# Patient Record
Sex: MALE | Race: WHITE | NOT HISPANIC OR LATINO | Employment: FULL TIME | URBAN - NONMETROPOLITAN AREA
[De-identification: names, ages, dates, MRNs, and addresses within clinical notes are randomized per-mention and may not be internally consistent; named-entity substitution may affect disease eponyms.]

---

## 2020-01-30 ENCOUNTER — HOSPITAL ENCOUNTER (OUTPATIENT)
Dept: ULTRASOUND IMAGING | Facility: HOSPITAL | Age: 40
Discharge: HOME | End: 2020-01-30
Admitting: NURSE PRACTITIONER

## 2020-01-30 ENCOUNTER — TRANSCRIBE ORDERS (OUTPATIENT)
Dept: ULTRASOUND IMAGING | Facility: HOSPITAL | Age: 40
End: 2020-01-30

## 2020-01-30 DIAGNOSIS — R10.9 ABDOMINAL PAIN, UNSPECIFIED ABDOMINAL LOCATION: ICD-10-CM

## 2020-01-30 DIAGNOSIS — R10.9 ABDOMINAL PAIN, UNSPECIFIED ABDOMINAL LOCATION: Primary | ICD-10-CM

## 2020-01-30 PROCEDURE — 76705 ECHO EXAM OF ABDOMEN: CPT

## 2022-08-17 ENCOUNTER — HOSPITAL ENCOUNTER (OUTPATIENT)
Dept: GENERAL RADIOLOGY | Facility: HOSPITAL | Age: 42
Discharge: HOME | End: 2022-08-17

## 2022-08-17 ENCOUNTER — TRANSCRIBE ORDERS (OUTPATIENT)
Dept: GENERAL RADIOLOGY | Facility: HOSPITAL | Age: 42
End: 2022-08-17

## 2022-08-17 DIAGNOSIS — Z02.1 ENCOUNTER FOR PRE-EMPLOYMENT EXAMINATION: ICD-10-CM

## 2022-08-17 DIAGNOSIS — Z02.1 ENCOUNTER FOR PRE-EMPLOYMENT EXAMINATION: Primary | ICD-10-CM

## 2022-08-17 PROCEDURE — 71046 X-RAY EXAM CHEST 2 VIEWS: CPT

## 2022-08-17 PROCEDURE — 71046 X-RAY EXAM CHEST 2 VIEWS: CPT | Performed by: RADIOLOGY

## 2024-04-17 ENCOUNTER — OFFICE VISIT (OUTPATIENT)
Dept: FAMILY MEDICINE CLINIC | Facility: CLINIC | Age: 44
End: 2024-04-17
Payer: COMMERCIAL

## 2024-04-17 VITALS
HEART RATE: 79 BPM | WEIGHT: 248 LBS | DIASTOLIC BLOOD PRESSURE: 80 MMHG | BODY MASS INDEX: 34.72 KG/M2 | OXYGEN SATURATION: 96 % | SYSTOLIC BLOOD PRESSURE: 100 MMHG | HEIGHT: 71 IN

## 2024-04-17 DIAGNOSIS — R73.03 PREDIABETES: Primary | ICD-10-CM

## 2024-04-17 DIAGNOSIS — N64.4 BREAST PAIN, RIGHT: ICD-10-CM

## 2024-04-17 DIAGNOSIS — F41.9 ANXIETY: ICD-10-CM

## 2024-04-17 DIAGNOSIS — K21.9 GASTROESOPHAGEAL REFLUX DISEASE WITHOUT ESOPHAGITIS: ICD-10-CM

## 2024-04-17 DIAGNOSIS — M10.9 GOUT, UNSPECIFIED CAUSE, UNSPECIFIED CHRONICITY, UNSPECIFIED SITE: ICD-10-CM

## 2024-04-17 DIAGNOSIS — N62 GYNECOMASTIA, MALE: ICD-10-CM

## 2024-04-17 DIAGNOSIS — I10 PRIMARY HYPERTENSION: ICD-10-CM

## 2024-04-17 DIAGNOSIS — E78.1 PURE HYPERTRIGLYCERIDEMIA: ICD-10-CM

## 2024-04-17 DIAGNOSIS — T78.40XS ALLERGY, SEQUELA: ICD-10-CM

## 2024-04-17 LAB
EXPIRATION DATE: NORMAL
HBA1C MFR BLD: 5.6 % (ref 4.5–5.7)
Lab: NORMAL

## 2024-04-17 RX ORDER — CETIRIZINE HYDROCHLORIDE 10 MG/1
TABLET ORAL
COMMUNITY
Start: 2024-04-15

## 2024-04-17 RX ORDER — FENOFIBRATE 160 MG/1
TABLET ORAL
COMMUNITY
Start: 2024-03-20

## 2024-04-17 RX ORDER — ALLOPURINOL 300 MG/1
TABLET ORAL
COMMUNITY
Start: 2024-03-19

## 2024-04-17 RX ORDER — ESCITALOPRAM OXALATE 5 MG/1
TABLET ORAL
COMMUNITY
Start: 2023-12-21 | End: 2024-04-17 | Stop reason: SDUPTHER

## 2024-04-17 RX ORDER — ESCITALOPRAM OXALATE 5 MG/1
5 TABLET ORAL DAILY
Qty: 30 TABLET | Refills: 0 | Status: SHIPPED | OUTPATIENT
Start: 2024-04-17

## 2024-04-17 RX ORDER — OMEPRAZOLE 40 MG/1
CAPSULE, DELAYED RELEASE ORAL
COMMUNITY

## 2024-04-17 RX ORDER — ESCITALOPRAM OXALATE 5 MG/1
5 TABLET ORAL DAILY
Qty: 90 TABLET | Refills: 3 | Status: SHIPPED | OUTPATIENT
Start: 2024-04-17

## 2024-04-17 NOTE — PROGRESS NOTES
New Patient History and Physical      Referring Physician: No ref. provider found    Subjective     Chief Complaint:    Chief Complaint   Patient presents with    Heartburn    Hyperlipidemia    Anxiety    Depression       History of Present Illness:   Here as a new patient, prior patient of Dr London at [facility]  Anxiety, lexparo, controlled  Gerd, prilosec, controlled  Gout, allopurinal, last flare October  Hypertrigs, fenofibrate, has been on for about 3 years  Diabetes/prediabetes, not on meds, attempts to work on diet, lost 10lbs and weight loss has stalled   Right breast lump in novemeber, started bilaterally but now just on right, had mammogram at [facility] in [place] that was normal,       Review of Systems   Gen- No fevers, chills  CV- No chest pain, palpitations  Resp- No cough, dyspnea  GI- No N/V/D, abd pain  Neuro-No dizziness, headaches    Past Medical History:   Past Medical History:   Diagnosis Date    Allergies     Anxiety     Diabetes mellitus     GERD (gastroesophageal reflux disease)     Gout     HLD (hyperlipidemia)     Hypertension     Kidney stone        Past Surgical History:  Past Surgical History:   Procedure Laterality Date    APPENDECTOMY         Family History: family history includes Arthritis in his brother, father, and sister; Cancer in his father and paternal grandfather; Diabetes in his mother; Hyperlipidemia in his brother; Hypertension in his brother; Other in his brother, mother, and sister.    Social History:  reports that he quit smoking about 18 years ago. His smoking use included cigarettes. He has been exposed to tobacco smoke. He has never used smokeless tobacco. He reports that he does not drink alcohol and does not use drugs.    Medications:    Current Outpatient Medications:     allopurinol (ZYLOPRIM) 300 MG tablet, , Disp: , Rfl:     cetirizine (zyrTEC) 10 MG tablet, , Disp: , Rfl:     escitalopram (LEXAPRO) 5 MG tablet, Take 1 tablet by mouth  "Daily., Disp: 90 tablet, Rfl: 3    fenofibrate 160 MG tablet, , Disp: , Rfl:     multivitamin with minerals (MULTIVITAMIN ADULT PO), Take 1 tablet by mouth Daily., Disp: , Rfl:     omeprazole (priLOSEC) 40 MG capsule, omeprazole 40 mg capsule,delayed release, Disp: , Rfl:     escitalopram (Lexapro) 5 MG tablet, Take 1 tablet by mouth Daily., Disp: 30 tablet, Rfl: 0    Allergies:  Allergies   Allergen Reactions    Prednisone Dizziness       Objective     Vital Signs:   Vitals:    04/17/24 1333   BP: 100/80   Pulse: 79   SpO2: 96%   Weight: 112 kg (248 lb)   Height: 180.3 cm (71\")     Body mass index is 34.59 kg/m².    Physical Exam:    Physical Exam  Constitutional:       Appearance: Normal appearance. He is well-developed.   HENT:      Head: Normocephalic and atraumatic.      Right Ear: Tympanic membrane, ear canal and external ear normal.      Left Ear: Tympanic membrane, ear canal and external ear normal.      Nose: Nose normal.      Mouth/Throat:      Pharynx: Uvula midline.   Eyes:      Pupils: Pupils are equal, round, and reactive to light.   Cardiovascular:      Rate and Rhythm: Normal rate and regular rhythm.      Heart sounds: Normal heart sounds. No murmur heard.     No friction rub. No gallop.   Pulmonary:      Effort: Pulmonary effort is normal.      Breath sounds: Normal breath sounds.   Abdominal:      General: Bowel sounds are normal.      Palpations: Abdomen is soft.      Tenderness: There is no abdominal tenderness.   Musculoskeletal:      Cervical back: Neck supple.   Lymphadenopathy:      Head:      Right side of head: No submental, submandibular, tonsillar, preauricular or posterior auricular adenopathy.      Left side of head: No submental, submandibular, tonsillar, preauricular or posterior auricular adenopathy.      Cervical: No cervical adenopathy.   Skin:     General: Skin is warm and dry.   Neurological:      General: No focal deficit present.      Mental Status: He is alert and oriented to " person, place, and time.   Psychiatric:         Mood and Affect: Mood normal.         Behavior: Behavior normal.         Assessment / Plan     Assessment/Plan:   Problem List Items Addressed This Visit       Allergies    Anxiety    Relevant Medications    escitalopram (LEXAPRO) 5 MG tablet    GERD (gastroesophageal reflux disease)    Relevant Medications    omeprazole (priLOSEC) 40 MG capsule    Gout    HLD (hyperlipidemia)    Relevant Medications    fenofibrate 160 MG tablet    Hypertension     Other Visit Diagnoses       Prediabetes    -  Primary    Relevant Orders    POC Glycosylated Hemoglobin (Hb A1C) (Completed)    Gynecomastia, male        Breast pain, right              -- A1c 5.6, continue diet and exercise  -- continue current meds  -- check labs      Discussed plan of care in detail with pt today; pt verb understanding and agrees.    I have reviewed pertinent health maintenance applicable to this patient.    Follow up:  Return in about 4 months (around 8/17/2024).      Electronically signed by EDDI Doyle   04/17/2024 13:45 EDT      Please note that portions of this note were completed with a voice recognition program.

## 2024-06-07 ENCOUNTER — OFFICE VISIT (OUTPATIENT)
Dept: FAMILY MEDICINE CLINIC | Facility: CLINIC | Age: 44
End: 2024-06-07
Payer: COMMERCIAL

## 2024-06-07 VITALS
DIASTOLIC BLOOD PRESSURE: 60 MMHG | BODY MASS INDEX: 35.84 KG/M2 | OXYGEN SATURATION: 95 % | HEART RATE: 82 BPM | SYSTOLIC BLOOD PRESSURE: 106 MMHG | HEIGHT: 71 IN | WEIGHT: 256 LBS

## 2024-06-07 DIAGNOSIS — M25.571 ACUTE RIGHT ANKLE PAIN: ICD-10-CM

## 2024-06-07 DIAGNOSIS — M77.51 RIGHT ANKLE TENDONITIS: ICD-10-CM

## 2024-06-07 DIAGNOSIS — F41.9 ANXIETY: Primary | ICD-10-CM

## 2024-06-07 DIAGNOSIS — N62 GYNECOMASTIA, MALE: ICD-10-CM

## 2024-06-07 DIAGNOSIS — N64.4 BREAST PAIN, RIGHT: ICD-10-CM

## 2024-06-07 PROCEDURE — 99214 OFFICE O/P EST MOD 30 MIN: CPT | Performed by: NURSE PRACTITIONER

## 2024-06-07 RX ORDER — BUPROPION HYDROCHLORIDE 150 MG/1
150 TABLET ORAL DAILY
Qty: 30 TABLET | Refills: 1 | Status: SHIPPED | OUTPATIENT
Start: 2024-06-07

## 2024-06-07 RX ORDER — COLCHICINE 0.6 MG/1
TABLET ORAL
COMMUNITY

## 2024-06-07 NOTE — PROGRESS NOTES
Subjective     Chief Complaint:    Chief Complaint   Patient presents with    Anxiety     Discuss anxiety medication. Patient states that he is staying tired and hungry all the time, and sexual side effects from his medication.      Ankle Pain     Complaints of right ankle pain.    Cyst     Complaints of lump on chest that he would like to discuss. Patient states that this has been an on going issue.       History of Present Illness:   Stays tired all the time and is more hungry than he used to be, has basically destroyed sexual desire, has gained about 10lbs since last visit and has not changed his eating habits   Usually sleeps 7-8 hours, does not drink any energy drinks, no additional caffeine intake   Has been on Lexapro for about 4-5 months     Right ankle pain on the posterior side of ankle for 2-3 months, endorses shooting pain into heel at times and back of calf, is intermittent   Denies any recent falls, injury to ankle, or change in activity - has noticed some slight swelling in right ankle at times   Does take ibuprofen for pain in ankle, helps slightly with pain  Patient's spouse is worried that he has restless legs, legs twitch about every 5-10 seconds per spouse, has woken up a few times from the twitching, legs feel tired and uncomfortable at times     Had prior mammogram and ultrasound of cyst (-), is  to the touch and feels like it has gotten slightly larger   Is interested in having a referral to have it removed       Review of Systems  Gen- No fevers, chills  CV- No chest pain, palpitations  Resp- No cough, dyspnea  GI- No N/V/D, abd pain  Neuro-No dizziness, headaches      I have reviewed and/or updated the patient's past medical, surgical, family, social history and problem list as appropriate.     Medications:    Current Outpatient Medications:     allopurinol (ZYLOPRIM) 300 MG tablet, , Disp: , Rfl:     cetirizine (zyrTEC) 10 MG tablet, , Disp: , Rfl:     colchicine 0.6 MG  "tablet, Take 2 tablets at first sign of flare, then take 1 tablet 1 hour later.  On subsequent days, take 1 tablet daily until flare is resolved., Disp: , Rfl:     fenofibrate 160 MG tablet, , Disp: , Rfl:     multivitamin with minerals (MULTIVITAMIN ADULT PO), Take 1 tablet by mouth Daily., Disp: , Rfl:     omeprazole (priLOSEC) 40 MG capsule, omeprazole 40 mg capsule,delayed release, Disp: , Rfl:     buPROPion XL (Wellbutrin XL) 150 MG 24 hr tablet, Take 1 tablet by mouth Daily., Disp: 30 tablet, Rfl: 1    Allergies:  Allergies   Allergen Reactions    Prednisone Dizziness       Objective     Vital Signs:   Vitals:    06/07/24 0918   BP: 106/60   Pulse: 82   SpO2: 95%   Weight: 116 kg (256 lb)   Height: 180.3 cm (70.98\")     Body mass index is 35.72 kg/m².    Physical Exam:    Physical Exam  Constitutional:       Appearance: He is obese.   HENT:      Head: Normocephalic and atraumatic.   Cardiovascular:      Rate and Rhythm: Normal rate and regular rhythm.      Pulses: Normal pulses.      Heart sounds: Normal heart sounds.   Pulmonary:      Effort: Pulmonary effort is normal.      Breath sounds: Normal breath sounds.   Abdominal:      General: Bowel sounds are normal.   Musculoskeletal:      Cervical back: Normal range of motion and neck supple.      Right foot: Tenderness present.      Comments: Pain with abduction and flexion    Skin:     General: Skin is warm and dry.   Neurological:      General: No focal deficit present.      Mental Status: He is alert. Mental status is at baseline.   Psychiatric:         Mood and Affect: Mood normal.         Behavior: Behavior normal.       Assessment / Plan     Assessment/Plan:   Problem List Items Addressed This Visit       Anxiety - Primary    Relevant Medications    buPROPion XL (Wellbutrin XL) 150 MG 24 hr tablet     Other Visit Diagnoses       Gynecomastia, male        Relevant Orders    Ambulatory Referral to General Surgery    Breast pain, right        Relevant Orders "    Ambulatory Referral to General Surgery    Acute right ankle pain        Relevant Orders    Ambulatory Referral to Physical Therapy    XR Ankle 3+ View Right (In Office) (Completed)    Right ankle tendonitis        Relevant Orders    Ambulatory Referral to Physical Therapy    XR Ankle 3+ View Right (In Office) (Completed)          -- Stop Escitalopram, start Bupropion XL - discussed tapering of lexapro for the next month to avoid withdrawal symptoms   -- Ankle xray ordered, referral to PT   -- Referral to general surgery for breast cyst evaluation     Discussed plan of care in detail with pt today; pt verb understanding and agrees.    Follow up:  8 weeks     IAriane, personally performed the services described in this documentation, as scribed by EDDI Uriostegui student in my presence, and is both accurate and complete       Please note that portions of this note were completed with a voice recognition program.

## 2024-06-14 DIAGNOSIS — R93.89 ABNORMAL X-RAY: ICD-10-CM

## 2024-06-14 DIAGNOSIS — M25.571 ACUTE RIGHT ANKLE PAIN: Primary | ICD-10-CM

## 2024-06-14 DIAGNOSIS — M77.51 RIGHT ANKLE TENDONITIS: ICD-10-CM

## 2024-06-25 NOTE — PROGRESS NOTES
Patient: Zi Nuñez    YOB: 1980    Date: 06/26/2024    Primary Care Provider: Ariane Kemp APRN    Chief Complaint   Patient presents with    Breast Mass       SUBJECTIVE:    History of present illness:  Patient is here for evaluation of pain with a palpable mass in his right breast.    Apparently the patient has had right breast pain and a palpable right breast mass over the past 6 months.  He has undergone previous mammogram in [place] and was felt to have no evidence of clinical abnormalities on mammography.  He continues to complain of right breast pain, sharp in nature, more located in the upper outer quadrant, worse with pressure, does not seem to be relieved, seems to be getting worse in nature.    The following portions of the patient's history were reviewed and updated as appropriate: allergies, current medications, past family history, past medical history, past social history, past surgical history and problem list.      Review of Systems   Constitutional:  Negative for chills, fever and unexpected weight change.   HENT:  Negative for trouble swallowing and voice change.    Eyes:  Negative for visual disturbance.   Respiratory:  Negative for apnea, cough, chest tightness, shortness of breath and wheezing.    Cardiovascular:  Negative for chest pain, palpitations and leg swelling.   Gastrointestinal:  Negative for abdominal distention, abdominal pain, anal bleeding, blood in stool, constipation, diarrhea, nausea, rectal pain and vomiting.   Endocrine: Negative for cold intolerance and heat intolerance.   Genitourinary:  Negative for difficulty urinating, dysuria, flank pain, scrotal swelling and testicular pain.   Musculoskeletal:  Negative for back pain, gait problem and joint swelling.   Skin:  Negative for color change, rash and wound.   Neurological:  Negative for dizziness, syncope, speech difficulty, weakness, numbness and headaches.   Hematological:  Negative for  adenopathy. Does not bruise/bleed easily.   Psychiatric/Behavioral:  Negative for confusion. The patient is not nervous/anxious.        Allergies:  Allergies   Allergen Reactions    Prednisone Dizziness       Medications:    Current Outpatient Medications:     allopurinol (ZYLOPRIM) 300 MG tablet, , Disp: , Rfl:     buPROPion XL (Wellbutrin XL) 150 MG 24 hr tablet, Take 1 tablet by mouth Daily., Disp: 30 tablet, Rfl: 1    cetirizine (zyrTEC) 10 MG tablet, , Disp: , Rfl:     colchicine 0.6 MG tablet, Take 2 tablets at first sign of flare, then take 1 tablet 1 hour later.  On subsequent days, take 1 tablet daily until flare is resolved., Disp: , Rfl:     fenofibrate 160 MG tablet, , Disp: , Rfl:     multivitamin with minerals (MULTIVITAMIN ADULT PO), Take 1 tablet by mouth Daily., Disp: , Rfl:     omeprazole (priLOSEC) 40 MG capsule, omeprazole 40 mg capsule,delayed release, Disp: , Rfl:     History:  Past Medical History:   Diagnosis Date    Allergies     Anxiety     Diabetes mellitus     GERD (gastroesophageal reflux disease)     Gout     HLD (hyperlipidemia)     Hypertension     Kidney stone        Past Surgical History:   Procedure Laterality Date    APPENDECTOMY         Family History   Problem Relation Age of Onset    Other Mother     Diabetes Mother     Cancer Father     Arthritis Father     Other Sister     Arthritis Sister     Other Brother     Hyperlipidemia Brother     Hypertension Brother     Arthritis Brother     Cancer Paternal Grandfather        Social History     Tobacco Use    Smoking status: Former     Current packs/day: 0.00     Types: Cigarettes     Quit date:      Years since quittin.4     Passive exposure: Past    Smokeless tobacco: Never   Vaping Use    Vaping status: Never Used   Substance Use Topics    Alcohol use: Never    Drug use: Never        OBJECTIVE:    Vital Signs:   Vitals:    24 1531   BP: 102/64   Pulse: 71   Temp: 97.1 °F (36.2 °C)   SpO2: 96%   Weight: 116 kg (255  "lb)   Height: 180.3 cm (70.98\")         Physical Exam:     General Appearance:    Alert, cooperative, in no acute distress   Head:    Normocephalic, without obvious abnormality, atraumatic   Eyes:            Lids and lashes normal, conjunctivae and sclerae normal, no   icterus, no pallor, corneas clear, PERRLA   Ears:    Ears appear intact with no abnormalities noted   Throat:   No oral lesions, no thrush, oral mucosa moist   Neck:   No adenopathy, supple, trachea midline, no thyromegaly, no   carotid bruit, no JVD   Back:     No kyphosis present, no scoliosis present, no skin lesions,      erythema or scars, no tenderness to percussion or                   palpation,   range of motion normal   Lungs:     Clear to auscultation,respirations regular, even and                  unlabored    Heart:    Regular rhythm and normal rate, normal S1 and S2, no            murmur, no gallop, no rub, no click   Chest Wall:    No abnormalities observed   Abdomen:     Normal bowel sounds, no masses, no organomegaly, soft        non-tender, non-distended, no guarding, there is evidence of right upper quadrant tenderness   Extremities:   Moves all extremities well, no edema, no cyanosis, no             redness   Pulses:   Pulses palpable and equal bilaterally   Breast:  Skin:   No evidence of palpable mass in the left breast, right breast is tender and has a palpable abnormality under the nipple    No bleeding, bruising or rash   Lymph nodes:   No palpable adenopathy   Neurologic:   Cranial nerves 2 - 12 grossly intact, sensation intact, DTR       present and equal bilaterally     Results Review:   I reviewed the patient's new clinical results.  I reviewed the patient's new imaging results and agree with the interpretation.  I reviewed the patient's other test results and agree with the interpretation    Review of Systems was reviewed and confirmed as accurate as documented by the MA.    ASSESSMENT/PLAN:    1. Breast pain        I did " have a detailed and extensive discussion with the patient in the office today and he does have right breast tenderness and a palpable mass and I am suspicious that he may need to undergo subcutaneous mastectomy.  This very well could be gynecomastia but we need to rule out something else, full risk and benefits of operative versus nonoperative intervention have been discussed with the patient, he understands and agrees, and wishes to proceed.    I discussed the patients findings and my recommendations with patient        Electronically signed by James Thomson MD  06/26/24

## 2024-06-26 ENCOUNTER — OFFICE VISIT (OUTPATIENT)
Dept: SURGERY | Facility: CLINIC | Age: 44
End: 2024-06-26
Payer: COMMERCIAL

## 2024-06-26 VITALS
HEIGHT: 71 IN | DIASTOLIC BLOOD PRESSURE: 64 MMHG | TEMPERATURE: 97.1 F | HEART RATE: 71 BPM | WEIGHT: 255 LBS | OXYGEN SATURATION: 96 % | BODY MASS INDEX: 35.7 KG/M2 | SYSTOLIC BLOOD PRESSURE: 102 MMHG

## 2024-06-26 DIAGNOSIS — N64.4 BREAST PAIN: Primary | ICD-10-CM

## 2024-06-26 PROCEDURE — 99204 OFFICE O/P NEW MOD 45 MIN: CPT | Performed by: SURGERY

## 2024-06-28 ENCOUNTER — TELEPHONE (OUTPATIENT)
Dept: SURGERY | Facility: CLINIC | Age: 44
End: 2024-06-28
Payer: COMMERCIAL

## 2024-06-28 NOTE — TELEPHONE ENCOUNTER
YAYAM denise Pinon to call and reschedule PO appt with .Provider will be out of the office on 06/30/2024.

## 2024-07-16 ENCOUNTER — OUTSIDE FACILITY SERVICE (OUTPATIENT)
Dept: SURGERY | Facility: CLINIC | Age: 44
End: 2024-07-16
Payer: COMMERCIAL

## 2024-07-16 DIAGNOSIS — N64.4 BREAST PAIN: Primary | ICD-10-CM

## 2024-07-16 RX ORDER — HYDROCODONE BITARTRATE AND ACETAMINOPHEN 7.5; 325 MG/1; MG/1
1 TABLET ORAL EVERY 6 HOURS PRN
Qty: 20 TABLET | Refills: 0 | Status: SHIPPED | OUTPATIENT
Start: 2024-07-16

## 2024-07-26 ENCOUNTER — PRIOR AUTHORIZATION (OUTPATIENT)
Dept: FAMILY MEDICINE CLINIC | Facility: CLINIC | Age: 44
End: 2024-07-26
Payer: COMMERCIAL

## 2024-07-26 ENCOUNTER — OFFICE VISIT (OUTPATIENT)
Dept: FAMILY MEDICINE CLINIC | Facility: CLINIC | Age: 44
End: 2024-07-26
Payer: COMMERCIAL

## 2024-07-26 VITALS
HEART RATE: 81 BPM | OXYGEN SATURATION: 95 % | SYSTOLIC BLOOD PRESSURE: 110 MMHG | WEIGHT: 254 LBS | DIASTOLIC BLOOD PRESSURE: 80 MMHG | HEIGHT: 71 IN | BODY MASS INDEX: 35.56 KG/M2

## 2024-07-26 DIAGNOSIS — R73.03 PREDIABETES: ICD-10-CM

## 2024-07-26 DIAGNOSIS — E66.01 CLASS 2 SEVERE OBESITY DUE TO EXCESS CALORIES WITH SERIOUS COMORBIDITY AND BODY MASS INDEX (BMI) OF 35.0 TO 35.9 IN ADULT: ICD-10-CM

## 2024-07-26 DIAGNOSIS — F41.9 ANXIETY: ICD-10-CM

## 2024-07-26 DIAGNOSIS — M77.51 RIGHT ANKLE TENDONITIS: ICD-10-CM

## 2024-07-26 DIAGNOSIS — R53.83 FATIGUE, UNSPECIFIED TYPE: ICD-10-CM

## 2024-07-26 DIAGNOSIS — F39 MOOD DISORDER: Primary | ICD-10-CM

## 2024-07-26 DIAGNOSIS — M25.571 ACUTE RIGHT ANKLE PAIN: ICD-10-CM

## 2024-07-26 PROCEDURE — 99214 OFFICE O/P EST MOD 30 MIN: CPT | Performed by: NURSE PRACTITIONER

## 2024-07-26 RX ORDER — LAMOTRIGINE 25 MG/1
TABLET ORAL
Qty: 60 TABLET | Refills: 1 | Status: SHIPPED | OUTPATIENT
Start: 2024-07-26

## 2024-07-26 NOTE — PROGRESS NOTES
"      Subjective     Chief Complaint:    Chief Complaint   Patient presents with    Anxiety       History of Present Illness:   Was transitioned to wellbutrin from lexapro. Notes increase in irritability. Mood swings, at times pretty rapid.   Cotninue to struggle to loose weight, is consistent with diet and exercise but maxes out at about 8-10lbs weight loss and then stalls. Has been dieting and exercising for over 6 months   Prediabetes  Ankle pain better after PT, would like to hold off on ortho eval at this time    Review of Systems  Gen- No fevers, chills  CV- No chest pain, palpitations  Resp- No cough, dyspnea  GI- No N/V/D, abd pain  Neuro-No dizziness, headaches      I have reviewed and/or updated the patient's past medical, surgical, family, social history and problem list as appropriate.     Medications:    Current Outpatient Medications:     allopurinol (ZYLOPRIM) 300 MG tablet, , Disp: , Rfl:     cetirizine (zyrTEC) 10 MG tablet, , Disp: , Rfl:     colchicine 0.6 MG tablet, Take 2 tablets at first sign of flare, then take 1 tablet 1 hour later.  On subsequent days, take 1 tablet daily until flare is resolved., Disp: , Rfl:     fenofibrate 160 MG tablet, , Disp: , Rfl:     multivitamin with minerals (MULTIVITAMIN ADULT PO), Take 1 tablet by mouth Daily., Disp: , Rfl:     omeprazole (priLOSEC) 40 MG capsule, omeprazole 40 mg capsule,delayed release, Disp: , Rfl:     lamoTRIgine (LaMICtal) 25 MG tablet, 25mg once daily x 1 week, 50mg po daily thereafter, Disp: 60 tablet, Rfl: 1    Tirzepatide-Weight Management (ZEPBOUND) 2.5 MG/0.5ML solution auto-injector, Inject 0.5 mL under the skin into the appropriate area as directed 1 (One) Time Per Week., Disp: 2 mL, Rfl: 0    Allergies:  Allergies   Allergen Reactions    Prednisone Dizziness       Objective     Vital Signs:   Vitals:    07/26/24 0831   BP: 110/80   Pulse: 81   SpO2: 95%   Weight: 115 kg (254 lb)   Height: 180.3 cm (71\")     Body mass index is 35.43 " kg/m².    Physical Exam:    Physical Exam  Vitals and nursing note reviewed.   Constitutional:       Appearance: He is well-developed.   HENT:      Head: Normocephalic and atraumatic.   Eyes:      Pupils: Pupils are equal, round, and reactive to light.   Cardiovascular:      Rate and Rhythm: Normal rate.   Pulmonary:      Effort: Pulmonary effort is normal.   Musculoskeletal:      Cervical back: Neck supple.   Neurological:      General: No focal deficit present.      Mental Status: He is alert and oriented to person, place, and time.   Psychiatric:         Mood and Affect: Mood normal.         Behavior: Behavior normal.         Assessment / Plan     Assessment/Plan:   Problem List Items Addressed This Visit       Anxiety     Other Visit Diagnoses       Mood disorder    -  Primary    Relevant Medications    Tirzepatide-Weight Management (ZEPBOUND) 2.5 MG/0.5ML solution auto-injector    Class 2 severe obesity due to excess calories with serious comorbidity and body mass index (BMI) of 35.0 to 35.9 in adult        Fatigue, unspecified type        Relevant Orders    Testosterone    Acute right ankle pain        Right ankle tendonitis        Prediabetes              -- stop wellbutrin as instructed, start lamictal  -- trial zepbound and contine diet and exercise as discussed  -- ok to hold off on ortho at this time, continue home PT to help as 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    Follow up:  6 weeks    Electronically signed by EDDI Doyle   07/26/2024 08:36 EDT      Please note that portions of this note were completed with a voice recognition program.

## 2024-07-26 NOTE — TELEPHONE ENCOUNTER
A prior auth has been started through cover my meds for zepbound.    Waiting on a response from the insurance.    Key: [id number]      Approved.      Start: 06/26/2024    End: 03/23/2025    Pharmacy have been notified.

## 2024-07-27 LAB — TESTOST SERPL-MCNC: 267 NG/DL (ref 264–916)

## 2024-07-27 NOTE — PROGRESS NOTES
Testosterone is on lower end of normal. I think we should monitor for now. Losing weight will help so lets see what happens with the zepbound.

## 2024-07-30 NOTE — PROGRESS NOTES
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  They state that they have done well and are having no problems.    Vital Signs:  Vitals:    07/31/24 1420   Pulse: 81   Temp: 97.1 °F (36.2 °C)   SpO2: 96%   Weight: 115 kg (254 lb)   Height: 180.3 cm (70.98\")       Physical Exam:     General : no acute distress  Chest : clear bilaterally  COR : regular rate and rhythm  ABD :  soft nontender, all incisions healed nicely      Assessment / Plan:    1. Post-operative state        I did discuss the situation with the patient today in the office and they have done well from their recent right subcutaneous mastectomy, I don't think that the patient needs any further intervention and I need to see them back only if they have further problems. Pathology report was reviewed with the patient in the office.    Electronically signed by James Thomson MD  07/31/24                    "

## 2024-07-31 ENCOUNTER — OFFICE VISIT (OUTPATIENT)
Dept: SURGERY | Facility: CLINIC | Age: 44
End: 2024-07-31
Payer: COMMERCIAL

## 2024-07-31 VITALS
WEIGHT: 254 LBS | OXYGEN SATURATION: 96 % | BODY MASS INDEX: 35.56 KG/M2 | HEART RATE: 81 BPM | HEIGHT: 71 IN | TEMPERATURE: 97.1 F

## 2024-07-31 DIAGNOSIS — Z98.890 POST-OPERATIVE STATE: Primary | ICD-10-CM

## 2024-07-31 PROCEDURE — 99024 POSTOP FOLLOW-UP VISIT: CPT | Performed by: SURGERY

## 2024-08-02 ENCOUNTER — TELEPHONE (OUTPATIENT)
Dept: FAMILY MEDICINE CLINIC | Facility: CLINIC | Age: 44
End: 2024-08-02
Payer: COMMERCIAL

## 2024-08-02 NOTE — TELEPHONE ENCOUNTER
Caller: Zi Nuñez    Relationship: Self    Best call back number: [phone]     Caller requesting test results: PT    What test was performed: BLOOD WORK    When was the test performed: 07-26-24    Where was the test performed: OFFICE    Additional notes: PT WANT TO KNOW IF HE NEEDS TO RETAKE THE TEST DUE TO NOT HAVING FASTED FOR THE LAST TEST. PLEASE LEAVE PT MSG AS HE WORKS 3RD SHIFT AND NOT AVAILABLE TO TALK UNTIL LATE AFTERNOON.

## 2024-08-16 DIAGNOSIS — E66.01 CLASS 2 SEVERE OBESITY DUE TO EXCESS CALORIES WITH SERIOUS COMORBIDITY AND BODY MASS INDEX (BMI) OF 35.0 TO 35.9 IN ADULT: Primary | ICD-10-CM

## 2024-08-16 RX ORDER — LAMOTRIGINE 25 MG/1
TABLET ORAL
Qty: 180 TABLET | Refills: 1 | Status: SHIPPED | OUTPATIENT
Start: 2024-08-16

## 2024-08-27 ENCOUNTER — TELEPHONE (OUTPATIENT)
Dept: FAMILY MEDICINE CLINIC | Facility: CLINIC | Age: 44
End: 2024-08-27

## 2024-09-06 ENCOUNTER — TELEPHONE (OUTPATIENT)
Dept: FAMILY MEDICINE CLINIC | Facility: CLINIC | Age: 44
End: 2024-09-06

## 2024-09-06 ENCOUNTER — OFFICE VISIT (OUTPATIENT)
Dept: FAMILY MEDICINE CLINIC | Facility: CLINIC | Age: 44
End: 2024-09-06
Payer: COMMERCIAL

## 2024-09-06 VITALS
BODY MASS INDEX: 35 KG/M2 | OXYGEN SATURATION: 96 % | HEART RATE: 82 BPM | WEIGHT: 250 LBS | SYSTOLIC BLOOD PRESSURE: 120 MMHG | DIASTOLIC BLOOD PRESSURE: 82 MMHG | HEIGHT: 71 IN

## 2024-09-06 DIAGNOSIS — F39 MOOD DISORDER: Primary | ICD-10-CM

## 2024-09-06 DIAGNOSIS — E66.01 CLASS 2 SEVERE OBESITY DUE TO EXCESS CALORIES WITH SERIOUS COMORBIDITY AND BODY MASS INDEX (BMI) OF 35.0 TO 35.9 IN ADULT: ICD-10-CM

## 2024-09-06 DIAGNOSIS — K52.9 AGE (ACUTE GASTROENTERITIS): ICD-10-CM

## 2024-09-06 DIAGNOSIS — F41.9 ANXIETY: ICD-10-CM

## 2024-09-06 PROCEDURE — 99214 OFFICE O/P EST MOD 30 MIN: CPT | Performed by: NURSE PRACTITIONER

## 2024-09-06 RX ORDER — ONDANSETRON 4 MG/1
TABLET, ORALLY DISINTEGRATING ORAL
COMMUNITY
Start: 2024-09-03

## 2024-09-06 NOTE — TELEPHONE ENCOUNTER
Caller: Zi Nuñez    Relationship: Self    Best call back number: [phone]     What medication are you requesting: PHENERGAN    If a prescription is needed, what is your preferred pharmacy and phone number: [id number] - [place], [place] - [address] - [phone] [facility] [phone] FX     Additional notes: PATIENT STATES THAT HE SAW KENYATTA EARLIER TODAY, AND THAT SHE WAS SUPPOSED TO SEND IN A PRESCRIPTION FOR PHENERGAN, BUT THAT HIS PHARMACY HAS NOT YET RECEIVED THAT    PLEASE ADVISE

## 2024-09-10 DIAGNOSIS — E66.01 CLASS 2 SEVERE OBESITY DUE TO EXCESS CALORIES WITH SERIOUS COMORBIDITY AND BODY MASS INDEX (BMI) OF 35.0 TO 35.9 IN ADULT: ICD-10-CM

## 2024-09-10 RX ORDER — TIRZEPATIDE 5 MG/.5ML
INJECTION, SOLUTION SUBCUTANEOUS
Qty: 2 ML | Refills: 0 | Status: SHIPPED | OUTPATIENT
Start: 2024-09-10 | End: 2024-09-13 | Stop reason: SDUPTHER

## 2024-09-10 NOTE — TELEPHONE ENCOUNTER
Pt returned call and stated he does not need the medication now. He feels better and will call if he needs anything

## 2024-09-13 ENCOUNTER — OFFICE VISIT (OUTPATIENT)
Dept: FAMILY MEDICINE CLINIC | Facility: CLINIC | Age: 44
End: 2024-09-13
Payer: COMMERCIAL

## 2024-09-13 VITALS
OXYGEN SATURATION: 98 % | SYSTOLIC BLOOD PRESSURE: 125 MMHG | WEIGHT: 235 LBS | HEIGHT: 71 IN | DIASTOLIC BLOOD PRESSURE: 80 MMHG | BODY MASS INDEX: 32.9 KG/M2 | HEART RATE: 86 BPM

## 2024-09-13 DIAGNOSIS — K21.9 GASTROESOPHAGEAL REFLUX DISEASE WITHOUT ESOPHAGITIS: ICD-10-CM

## 2024-09-13 DIAGNOSIS — F39 MOOD DISORDER: ICD-10-CM

## 2024-09-13 DIAGNOSIS — Z56.9 ADVERSE EXPOSURE IN WORKPLACE: ICD-10-CM

## 2024-09-13 DIAGNOSIS — E78.1 PURE HYPERTRIGLYCERIDEMIA: ICD-10-CM

## 2024-09-13 DIAGNOSIS — I10 PRIMARY HYPERTENSION: ICD-10-CM

## 2024-09-13 DIAGNOSIS — Z00.00 ANNUAL PHYSICAL EXAM: Primary | ICD-10-CM

## 2024-09-13 DIAGNOSIS — M10.9 GOUT, UNSPECIFIED CAUSE, UNSPECIFIED CHRONICITY, UNSPECIFIED SITE: ICD-10-CM

## 2024-09-13 DIAGNOSIS — F41.9 ANXIETY: ICD-10-CM

## 2024-09-13 DIAGNOSIS — N52.9 ERECTILE DYSFUNCTION, UNSPECIFIED ERECTILE DYSFUNCTION TYPE: ICD-10-CM

## 2024-09-13 DIAGNOSIS — E66.01 CLASS 2 SEVERE OBESITY DUE TO EXCESS CALORIES WITH SERIOUS COMORBIDITY AND BODY MASS INDEX (BMI) OF 35.0 TO 35.9 IN ADULT: ICD-10-CM

## 2024-09-13 RX ORDER — TIRZEPATIDE 5 MG/.5ML
5 INJECTION, SOLUTION SUBCUTANEOUS WEEKLY
Qty: 2 ML | Refills: 2 | Status: SHIPPED | OUTPATIENT
Start: 2024-09-13

## 2024-09-13 RX ORDER — SILDENAFIL 100 MG/1
100 TABLET, FILM COATED ORAL DAILY PRN
Qty: 30 TABLET | Refills: 5 | Status: SHIPPED | OUTPATIENT
Start: 2024-09-13

## 2024-09-13 SDOH — ECONOMIC STABILITY - INCOME SECURITY: UNSPECIFIED PROBLEMS RELATED TO EMPLOYMENT: Z56.9

## 2024-09-13 NOTE — PROGRESS NOTES
Subjective     Chief Complaint:    Chief Complaint   Patient presents with    Annual Exam       History of Present Illness:   Annual exam  Obesity, on zepbound, losing weight, eating less portions, is avoiding sweets, soda, eat more protein, fruits, veggies   Recent gi illness but doing much better, a little slower on his bm  Not exercising but is active at his job with walking   Recent changed back to zoloft due to anxiety/depression, continues lamicital as well, still feels the same as before   Hypertrigs, on tricor  Gout, no recent flares, on allopurinol and colcrys prn   Gerd controlled with ppi   Some sexual dysfunction, trouble maintain and achieving erection, testosterone low normal in July   Normal uop  No etoh  Smoker, not interested in quitting at this time   Wears seat belt   Eye exam utd    Dental utd        Review of Systems  Gen- No fevers, chills  CV- No chest pain, palpitations  Resp- No cough, dyspnea  GI- No N/V/D, abd pain  Neuro-No dizziness, headaches      I have reviewed and/or updated the patient's past medical, surgical, family, social history and problem list as appropriate.     Medications:    Current Outpatient Medications:     allopurinol (ZYLOPRIM) 300 MG tablet, , Disp: , Rfl:     cetirizine (zyrTEC) 10 MG tablet, , Disp: , Rfl:     colchicine 0.6 MG tablet, Take 2 tablets at first sign of flare, then take 1 tablet 1 hour later.  On subsequent days, take 1 tablet daily until flare is resolved., Disp: , Rfl:     fenofibrate 160 MG tablet, , Disp: , Rfl:     lamoTRIgine (LaMICtal) 25 MG tablet, 2 po daily, Disp: 180 tablet, Rfl: 1    multivitamin with minerals (MULTIVITAMIN ADULT PO), Take 1 tablet by mouth Daily., Disp: , Rfl:     omeprazole (priLOSEC) 40 MG capsule, omeprazole 40 mg capsule,delayed release, Disp: , Rfl:     ondansetron ODT (ZOFRAN-ODT) 4 MG disintegrating tablet, DISSOLVE 1 TABLET ON THE TONGUE EVERY 4 TO 6 HOURS AS NEEDED, Disp: , Rfl:     sertraline (Zoloft) 50  "MG tablet, 1/2 po daily x 7 days, then 1 po daily thereafter, Disp: 30 tablet, Rfl: 1    Zepbound 5 MG/0.5ML solution auto-injector, ADMINISTER 5 MG UNDER THE SKIN 1 TIME EVERY WEEK AS DIRECTED, Disp: 2 mL, Rfl: 0    sildenafil (Viagra) 100 MG tablet, Take 1 tablet by mouth Daily As Needed for Erectile Dysfunction., Disp: 30 tablet, Rfl: 5    Allergies:  Allergies   Allergen Reactions    Prednisone Dizziness       Objective     Vital Signs:   Vitals:    09/13/24 0955   BP: 125/80   Pulse: 86   SpO2: 98%   Weight: 107 kg (235 lb)   Height: 180.3 cm (71\")     Body mass index is 32.78 kg/m².    Physical Exam:    Physical Exam  Constitutional:       Appearance: Normal appearance. He is well-developed.   HENT:      Head: Normocephalic and atraumatic.      Right Ear: Tympanic membrane, ear canal and external ear normal.      Left Ear: Tympanic membrane, ear canal and external ear normal.      Nose: Nose normal.      Mouth/Throat:      Pharynx: Uvula midline.   Eyes:      Pupils: Pupils are equal, round, and reactive to light.   Cardiovascular:      Rate and Rhythm: Normal rate and regular rhythm.      Heart sounds: Normal heart sounds. No murmur heard.     No friction rub. No gallop.   Pulmonary:      Effort: Pulmonary effort is normal.      Breath sounds: Normal breath sounds.   Abdominal:      General: Bowel sounds are normal.      Palpations: Abdomen is soft.      Tenderness: There is no abdominal tenderness.   Musculoskeletal:      Cervical back: Neck supple.   Lymphadenopathy:      Head:      Right side of head: No submental, submandibular, tonsillar, preauricular or posterior auricular adenopathy.      Left side of head: No submental, submandibular, tonsillar, preauricular or posterior auricular adenopathy.      Cervical: No cervical adenopathy.   Skin:     General: Skin is warm and dry.   Neurological:      General: No focal deficit present.      Mental Status: He is alert and oriented to person, place, and time. "   Psychiatric:         Mood and Affect: Mood normal.         Behavior: Behavior normal.         Assessment / Plan     Assessment/Plan:   Problem List Items Addressed This Visit       Anxiety    Relevant Medications    sertraline (Zoloft) 50 MG tablet    GERD (gastroesophageal reflux disease)    Relevant Medications    omeprazole (priLOSEC) 40 MG capsule    Gout    HLD (hyperlipidemia)    Relevant Medications    fenofibrate 160 MG tablet    Hypertension     Other Visit Diagnoses       Annual physical exam    -  Primary    Erectile dysfunction, unspecified erectile dysfunction type        Relevant Medications    sildenafil (Viagra) 100 MG tablet    Mood disorder              -- physical performed today, encouraged clean eating, whole foods, limit processed and sugary foods, exercise 150min/week as tolerated  -- continue current mood meds  -- trial viagra  -- gout controlled  -- labs utd  -- continue current dose of zepbound, he can call for increase as instructed    Discussed plan of care in detail with pt today; pt verb understanding and agrees.    Follow up:  As scheduled    Patient has been erroneously marked as diabetic. Based on the available clinical information, he does not have diabetes and should therefore be excluded from diabetic health maintenance and quality measures for the remainder of the reporting period.      Electronically signed by EDDI Doyle   09/13/2024 10:11 EDT      Please note that portions of this note were completed with a voice recognition program.

## 2024-09-14 LAB
ALBUMIN SERPL-MCNC: 3.9 G/DL (ref 3.5–5.2)
ALBUMIN/GLOB SERPL: 1.4 G/DL
ALP SERPL-CCNC: 63 U/L (ref 39–117)
ALT SERPL-CCNC: 43 U/L (ref 1–41)
AST SERPL-CCNC: 27 U/L (ref 1–40)
BILIRUB SERPL-MCNC: 0.3 MG/DL (ref 0–1.2)
BUN SERPL-MCNC: 21 MG/DL (ref 6–20)
BUN/CREAT SERPL: 14.2 (ref 7–25)
CALCIUM SERPL-MCNC: 9.6 MG/DL (ref 8.6–10.5)
CHLORIDE SERPL-SCNC: 106 MMOL/L (ref 98–107)
CO2 SERPL-SCNC: 25.9 MMOL/L (ref 22–29)
CREAT SERPL-MCNC: 1.48 MG/DL (ref 0.76–1.27)
EGFRCR SERPLBLD CKD-EPI 2021: 59.5 ML/MIN/1.73
ERYTHROCYTE [DISTWIDTH] IN BLOOD BY AUTOMATED COUNT: 13.1 % (ref 12.3–15.4)
GLOBULIN SER CALC-MCNC: 2.7 GM/DL
GLUCOSE SERPL-MCNC: 87 MG/DL (ref 65–99)
HCT VFR BLD AUTO: 42.7 % (ref 37.5–51)
HGB BLD-MCNC: 14.1 G/DL (ref 13–17.7)
MCH RBC QN AUTO: 30.7 PG (ref 26.6–33)
MCHC RBC AUTO-ENTMCNC: 33 G/DL (ref 31.5–35.7)
MCV RBC AUTO: 93 FL (ref 79–97)
PLATELET # BLD AUTO: 223 10*3/MM3 (ref 140–450)
POTASSIUM SERPL-SCNC: 4 MMOL/L (ref 3.5–5.2)
PROT SERPL-MCNC: 6.6 G/DL (ref 6–8.5)
RBC # BLD AUTO: 4.59 10*6/MM3 (ref 4.14–5.8)
SODIUM SERPL-SCNC: 141 MMOL/L (ref 136–145)
WBC # BLD AUTO: 6.12 10*3/MM3 (ref 3.4–10.8)

## 2024-09-17 LAB — LEAD BLDV-MCNC: 4.9 UG/DL (ref 0–3.4)

## 2024-09-20 RX ORDER — LAMOTRIGINE 25 MG/1
TABLET ORAL
Qty: 60 TABLET | Refills: 1 | Status: SHIPPED | OUTPATIENT
Start: 2024-09-20

## 2024-10-28 DIAGNOSIS — E66.01 CLASS 2 SEVERE OBESITY DUE TO EXCESS CALORIES WITH SERIOUS COMORBIDITY AND BODY MASS INDEX (BMI) OF 35.0 TO 35.9 IN ADULT: ICD-10-CM

## 2024-10-28 DIAGNOSIS — E66.812 CLASS 2 SEVERE OBESITY DUE TO EXCESS CALORIES WITH SERIOUS COMORBIDITY AND BODY MASS INDEX (BMI) OF 35.0 TO 35.9 IN ADULT: ICD-10-CM

## 2024-10-29 DIAGNOSIS — F41.9 ANXIETY: ICD-10-CM

## 2024-10-29 DIAGNOSIS — F39 MOOD DISORDER: ICD-10-CM

## 2024-10-29 RX ORDER — TIRZEPATIDE 5 MG/.5ML
INJECTION, SOLUTION SUBCUTANEOUS
Qty: 2 ML | Refills: 2 | Status: SHIPPED | OUTPATIENT
Start: 2024-10-29

## 2024-11-05 ENCOUNTER — OFFICE VISIT (OUTPATIENT)
Dept: FAMILY MEDICINE CLINIC | Facility: CLINIC | Age: 44
End: 2024-11-05
Payer: COMMERCIAL

## 2024-11-05 VITALS
TEMPERATURE: 97.7 F | HEART RATE: 86 BPM | DIASTOLIC BLOOD PRESSURE: 80 MMHG | OXYGEN SATURATION: 97 % | HEIGHT: 71 IN | WEIGHT: 224.8 LBS | BODY MASS INDEX: 31.47 KG/M2 | SYSTOLIC BLOOD PRESSURE: 120 MMHG

## 2024-11-05 DIAGNOSIS — Z12.11 SCREEN FOR COLON CANCER: ICD-10-CM

## 2024-11-05 DIAGNOSIS — M10.9 GOUT, UNSPECIFIED CAUSE, UNSPECIFIED CHRONICITY, UNSPECIFIED SITE: ICD-10-CM

## 2024-11-05 DIAGNOSIS — Z11.59 ENCOUNTER FOR HEPATITIS C SCREENING TEST FOR LOW RISK PATIENT: ICD-10-CM

## 2024-11-05 DIAGNOSIS — F39 MOOD DISORDER: ICD-10-CM

## 2024-11-05 DIAGNOSIS — E66.812 CLASS 2 SEVERE OBESITY DUE TO EXCESS CALORIES WITH SERIOUS COMORBIDITY AND BODY MASS INDEX (BMI) OF 35.0 TO 35.9 IN ADULT: Primary | ICD-10-CM

## 2024-11-05 DIAGNOSIS — F41.9 ANXIETY: ICD-10-CM

## 2024-11-05 DIAGNOSIS — R25.2 LEG CRAMP: ICD-10-CM

## 2024-11-05 DIAGNOSIS — E78.1 PURE HYPERTRIGLYCERIDEMIA: ICD-10-CM

## 2024-11-05 DIAGNOSIS — E66.01 CLASS 2 SEVERE OBESITY DUE TO EXCESS CALORIES WITH SERIOUS COMORBIDITY AND BODY MASS INDEX (BMI) OF 35.0 TO 35.9 IN ADULT: Primary | ICD-10-CM

## 2024-11-05 DIAGNOSIS — K21.9 GASTROESOPHAGEAL REFLUX DISEASE WITHOUT ESOPHAGITIS: ICD-10-CM

## 2024-11-05 PROCEDURE — 99214 OFFICE O/P EST MOD 30 MIN: CPT | Performed by: NURSE PRACTITIONER

## 2024-11-05 NOTE — PROGRESS NOTES
"      Subjective     Chief Complaint:    Chief Complaint   Patient presents with    mood disorder     Follow up    Obesity       History of Present Illness:   Obesity, on zepbound, losing weight, eating less carbs and sugar, has been playing basketball three times a week  Depression/anxiety/mood disorder, back on just zoloft and doing well   Hypertrigs, on tricor  Gout, no recent flares, on allopurinol and colcrys prn   ED    Review of Systems  Gen- No fevers, chills  CV- No chest pain, palpitations  Resp- No cough, dyspnea  GI- No N/V/D, abd pain  Neuro-No dizziness, headaches      I have reviewed and/or updated the patient's past medical, surgical, family, social history and problem list as appropriate.     Medications:    Current Outpatient Medications:     allopurinol (ZYLOPRIM) 300 MG tablet, , Disp: , Rfl:     cetirizine (zyrTEC) 10 MG tablet, , Disp: , Rfl:     fenofibrate 160 MG tablet, , Disp: , Rfl:     multivitamin with minerals (MULTIVITAMIN ADULT PO), Take 1 tablet by mouth Daily., Disp: , Rfl:     omeprazole (priLOSEC) 40 MG capsule, omeprazole 40 mg capsule,delayed release, Disp: , Rfl:     sertraline (Zoloft) 50 MG tablet, Take 1 tablet by mouth Daily. 1/2 po daily x 7 days, then 1 po daily thereafter, Disp: 90 tablet, Rfl: 1    sildenafil (Viagra) 100 MG tablet, Take 1 tablet by mouth Daily As Needed for Erectile Dysfunction., Disp: 30 tablet, Rfl: 5    Zepbound 5 MG/0.5ML solution auto-injector, ADMINISTER 5 MG UNDER THE SKIN 1 TIME EVERY WEEK AS DIRECTED, Disp: 2 mL, Rfl: 2    Allergies:  Allergies   Allergen Reactions    Prednisone Dizziness       Objective     Vital Signs:   Vitals:    11/05/24 0840   BP: 120/80   Pulse: 86   Temp: 97.7 °F (36.5 °C)   SpO2: 97%   Weight: 102 kg (224 lb 12.8 oz)   Height: 180.3 cm (71\")     Body mass index is 31.35 kg/m².    Physical Exam:    Physical Exam  Vitals and nursing note reviewed.   Constitutional:       Appearance: He is well-developed.   HENT:      " Head: Normocephalic and atraumatic.   Eyes:      Pupils: Pupils are equal, round, and reactive to light.   Cardiovascular:      Rate and Rhythm: Normal rate and regular rhythm.      Heart sounds: Normal heart sounds.   Pulmonary:      Effort: Pulmonary effort is normal.      Breath sounds: Normal breath sounds.   Abdominal:      General: Bowel sounds are normal. There is no distension.      Palpations: Abdomen is soft.      Tenderness: There is no abdominal tenderness.   Musculoskeletal:      Cervical back: Neck supple.   Skin:     General: Skin is warm and dry.   Neurological:      General: No focal deficit present.      Mental Status: He is alert and oriented to person, place, and time.   Psychiatric:         Mood and Affect: Mood normal.         Behavior: Behavior normal.         Assessment / Plan     Assessment/Plan:   Problem List Items Addressed This Visit       Anxiety    Relevant Medications    sertraline (Zoloft) 50 MG tablet    GERD (gastroesophageal reflux disease)    Relevant Medications    omeprazole (priLOSEC) 40 MG capsule    Gout    HLD (hyperlipidemia)    Relevant Medications    fenofibrate 160 MG tablet    Other Relevant Orders    Basic Metabolic Panel    Lipid Panel     Other Visit Diagnoses       Class 2 severe obesity due to excess calories with serious comorbidity and body mass index (BMI) of 35.0 to 35.9 in adult    -  Primary    Mood disorder        Screen for colon cancer        Relevant Orders    Cologuard - Stool, Per Rectum    Leg cramp        Relevant Orders    Magnesium    Encounter for hepatitis C screening test for low risk patient        Relevant Orders    Hepatitis C Antibody          -- continue zepbound, he is losing weight and doing well   -- gout stable  -- gerd stable, continue ppu  -- labs        Discussed plan of care in detail with pt today; pt verb understanding and agrees.    Follow up:  3 months, weight check    Electronically signed by EDDI Doyle   11/05/2024  09:10 EST      Please note that portions of this note were completed with a voice recognition program.

## 2024-11-06 LAB
BUN SERPL-MCNC: 24 MG/DL (ref 6–20)
BUN/CREAT SERPL: 20.5 (ref 7–25)
CALCIUM SERPL-MCNC: 9.5 MG/DL (ref 8.6–10.5)
CHLORIDE SERPL-SCNC: 105 MMOL/L (ref 98–107)
CHOLEST SERPL-MCNC: 171 MG/DL (ref 0–200)
CO2 SERPL-SCNC: 22.9 MMOL/L (ref 22–29)
CREAT SERPL-MCNC: 1.17 MG/DL (ref 0.76–1.27)
EGFRCR SERPLBLD CKD-EPI 2021: 78.3 ML/MIN/1.73
GLUCOSE SERPL-MCNC: 102 MG/DL (ref 65–99)
HCV IGG SERPL QL IA: NON REACTIVE
HDLC SERPL-MCNC: 27 MG/DL (ref 40–60)
LDLC SERPL CALC-MCNC: 93 MG/DL (ref 0–100)
MAGNESIUM SERPL-MCNC: 2 MG/DL (ref 1.6–2.6)
POTASSIUM SERPL-SCNC: 3.9 MMOL/L (ref 3.5–5.2)
SODIUM SERPL-SCNC: 142 MMOL/L (ref 136–145)
TRIGL SERPL-MCNC: 301 MG/DL (ref 0–150)
VLDLC SERPL CALC-MCNC: 51 MG/DL (ref 5–40)

## 2024-12-05 ENCOUNTER — TELEPHONE (OUTPATIENT)
Dept: FAMILY MEDICINE CLINIC | Facility: CLINIC | Age: 44
End: 2024-12-05
Payer: COMMERCIAL

## 2024-12-05 NOTE — TELEPHONE ENCOUNTER
PATIENT SAID SOMEONE CALLED ABOUT HIS LAB RESULTS. I RELAYED PCP RESULT NOTE. HE ALSO ASKED IF WE RECEIVED THE RESULTS BACK FROM HIS COLOGUARD. I ADVISED THAT PCP WILL PUT HER NOTE IN WHEN SHE GETS THE REPORT AND HAVE SOMEONE CONTACT HIM ABOUT THOSE AS WELL.

## 2024-12-05 NOTE — TELEPHONE ENCOUNTER
Caller: Zi Nuñez    Relationship: Self    Best call back number: [phone]     Requested Prescriptions:   Requested Prescriptions     Pending Prescriptions Disp Refills    allopurinol (ZYLOPRIM) 300 MG tablet          Pharmacy where request should be sent: [facility] - [address] [phone] [facility] [phone] FX     Last office visit with prescribing clinician: 11/5/2024   Last telemedicine visit with prescribing clinician: Visit date not found   Next office visit with prescribing clinician: 2/11/2025       Does the patient have less than a 3 day supply:  [] Yes  [x] No    Would you like a call back once the refill request has been completed: [] Yes [x] No    If the office needs to give you a call back, can they leave a voicemail: [] Yes [x] No    Madeleine Vyas Rep   12/05/24 11:49 EST

## 2024-12-05 NOTE — TELEPHONE ENCOUNTER
Rx Refill Note  Requested Prescriptions     Pending Prescriptions Disp Refills    allopurinol (ZYLOPRIM) 300 MG tablet        Last office visit with prescribing clinician: 11/5/2024   Last telemedicine visit with prescribing clinician: Visit date not found   Next office visit with prescribing clinician: 2/11/2025                         Would you like a call back once the refill request has been completed: [] Yes [] No    If the office needs to give you a call back, can they leave a voicemail: [] Yes [] No    Frances Isidro CMA  12/05/24, 11:53 EST

## 2024-12-06 RX ORDER — ALLOPURINOL 300 MG/1
300 TABLET ORAL DAILY
Qty: 90 TABLET | Refills: 1 | Status: SHIPPED | OUTPATIENT
Start: 2024-12-06

## 2024-12-09 DIAGNOSIS — R19.5 POSITIVE COLORECTAL CANCER SCREENING USING COLOGUARD TEST: Primary | ICD-10-CM

## 2024-12-13 ENCOUNTER — TELEPHONE (OUTPATIENT)
Dept: SURGERY | Facility: CLINIC | Age: 44
End: 2024-12-13
Payer: COMMERCIAL

## 2024-12-13 NOTE — TELEPHONE ENCOUNTER
Left VM for the pt regarding a colonoscopy referral we received from EDDI Doyle--Will try again to reach the pt to go over the colonoscopy screening questions. 1st attempt.     *IF PT RETURNS THIS CALL, PLEASE SEND THE CALL TO THE OFFICE. ASK FOR LILLI OR JORGE, IF LILLI IS NOT AVAILABLE*

## 2024-12-30 ENCOUNTER — TELEPHONE (OUTPATIENT)
Dept: FAMILY MEDICINE CLINIC | Facility: CLINIC | Age: 44
End: 2024-12-30

## 2024-12-30 RX ORDER — CETIRIZINE HYDROCHLORIDE 10 MG/1
10 TABLET ORAL DAILY
Qty: 90 TABLET | Refills: 3 | Status: SHIPPED | OUTPATIENT
Start: 2024-12-30

## 2024-12-30 NOTE — TELEPHONE ENCOUNTER
Caller: Zi Nuñez    Relationship: Self    Best call back number: [phone]     Requested Prescriptions:   Requested Prescriptions     Pending Prescriptions Disp Refills    cetirizine (zyrTEC) 10 MG tablet          Pharmacy where request should be sent: [facility] [address] [phone] [facility] [phone] FX     Last office visit with prescribing clinician: 11/5/2024   Last telemedicine visit with prescribing clinician: Visit date not found   Next office visit with prescribing clinician: 2/11/2025     Additional details provided by patient:     Does the patient have less than a 3 day supply:  [x] Yes  [] No    Would you like a call back once the refill request has been completed: [x] Yes [] No    If the office needs to give you a call back, can they leave a voicemail: [x] Yes [] No    Madeleine Ruvalcaba Rep   12/30/24 13:15 EST        DISPLAY PLAN FREE TEXT

## 2024-12-30 NOTE — TELEPHONE ENCOUNTER
Caller: Zi Nuñez    Relationship: Self    Best call back number: [phone]     What is the best time to reach you: ANYTIME     Who are you requesting to speak with (clinical staff, provider,  specific staff member): CLINICAL STAFF    What was the call regarding: PATIENT STATES THAT HE HAS BEEN WITHOUT ZEPBOUND FOR SIX WEEKS AND HAS JUST NOW BEEN ABLE TO GET THE MEDICATION. HE WOULD LIKE TO KNOW IF HE IS OKAY TO CONTINUE THE MEDICATION ON THE 5 MG OR IF HE NEEDS TO GO BACK DOWN TO THE 2.5 MG.     Is it okay if the provider responds through MyChart: YES

## 2024-12-30 NOTE — TELEPHONE ENCOUNTER
Rx Refill Note  Requested Prescriptions     Pending Prescriptions Disp Refills    cetirizine (zyrTEC) 10 MG tablet        Not active on med list    Sonya Cheatham MA  12/30/24, 13:25 EST

## 2025-01-13 NOTE — PROGRESS NOTES
"Patient: Zi Nuñez    YOB: 1980    Date: 01/06/2025    Primary Care Provider: Ariane Kemp APRN    Chief Complaint   Patient presents with    Heartburn    Colonoscopy     Positive Cologuard       SUBJECTIVE:    History of present illness:  I saw the patient in the office today as a consultation for evaluation and treatment of \"acid reflux.\"  Patient is also here for consultation for recent positive Cologuard. He has no history colonoscopy. No family history of colon cancer.    He did have a recent Cologuard positive test, he has never had a previous colonoscopy.  He does take proton pump inhibitors for his reflux.    The following portions of the patient's history were reviewed and updated as appropriate: allergies, current medications, past family history, past medical history, past social history, past surgical history and problem list.    Review of Systems      Review of Systems:  Constitutional:  Negative for chills, fever, and unexpected weight change.  HENT: Negative for trouble swallowing and voice change.  Eyes:  Negative for visual disturbance.  Respiratory:  Negative for apnea, cough, chest tightness, shortness of breath, and wheezing.  Cardiovascular:  Negative for chest pain, palpitations, and leg swelling.  Gastrointestinal:  Negative for abdominal distention, abdominal pain, anal bleeding, blood in stool, constipation, diarrhea, nausea, rectal pain, and vomiting.  Musculoskeletal:  Negative for back pain, gait problem, and joint swelling.  Skin:  Negative for color change, rash, and wound  Neurological:  Negative for dizziness, syncope, speech difficulty, weakness, numbness, and headaches.  Hematological:  Negative for adenopathy.  Does not bruise/bleed easily.  Psychiatric/Behavioral:  Negative for confusion.  The patient is not nervous/anxious.          History:  Past Medical History:   Diagnosis Date    Allergies     Anxiety     Diabetes mellitus     GERD (gastroesophageal " reflux disease)     Gout     HLD (hyperlipidemia)     Hypertension     Kidney stone        Past Surgical History:   Procedure Laterality Date    APPENDECTOMY      BREAST BIOPSY Right     Had fatty tumor removed       Family History   Problem Relation Age of Onset    Other Mother     Diabetes Mother     Cancer Father     Arthritis Father     Other Sister     Arthritis Sister     Other Brother     Hyperlipidemia Brother     Hypertension Brother     Arthritis Brother     Cancer Paternal Grandfather        Social History     Tobacco Use    Smoking status: Every Day     Current packs/day: 1.00     Average packs/day: 1 pack/day for 0.5 years (0.5 ttl pk-yrs)     Types: Cigarettes     Start date: 8/1/2024     Last attempt to quit: 2006     Passive exposure: Past    Smokeless tobacco: Never   Vaping Use    Vaping status: Never Used   Substance Use Topics    Alcohol use: Never    Drug use: Never       Allergies:  Allergies   Allergen Reactions    Prednisone Dizziness       Medications:    Current Outpatient Medications:     allopurinol (ZYLOPRIM) 300 MG tablet, Take 1 tablet by mouth Daily., Disp: 90 tablet, Rfl: 1    cetirizine (zyrTEC) 10 MG tablet, Take 1 tablet by mouth Daily., Disp: 90 tablet, Rfl: 3    fenofibrate 160 MG tablet, , Disp: , Rfl:     multivitamin with minerals (MULTIVITAMIN ADULT PO), Take 1 tablet by mouth Daily., Disp: , Rfl:     omeprazole (priLOSEC) 40 MG capsule, omeprazole 40 mg capsule,delayed release, Disp: , Rfl:     sertraline (Zoloft) 50 MG tablet, Take 1 tablet by mouth Daily. 1/2 po daily x 7 days, then 1 po daily thereafter, Disp: 90 tablet, Rfl: 1    Zepbound 5 MG/0.5ML solution auto-injector, ADMINISTER 5 MG UNDER THE SKIN 1 TIME EVERY WEEK AS DIRECTED, Disp: 2 mL, Rfl: 2    sildenafil (Viagra) 100 MG tablet, Take 1 tablet by mouth Daily As Needed for Erectile Dysfunction., Disp: 30 tablet, Rfl: 5    OBJECTIVE:    Vital Signs:   Vitals:    01/21/25 1308   BP: 128/68   Pulse: 73   Resp: 18  "  Temp: 97.8 °F (36.6 °C)   TempSrc: Temporal   SpO2: 98%   Weight: 102 kg (224 lb 9.6 oz)   Height: 180.3 cm (71\")       Physical Exam:     General Appearance:    Alert, cooperative, in no acute distress   Head:    Normocephalic, without obvious abnormality, atraumatic   Eyes:            Lids and lashes normal, conjunctivae and sclerae normal, no   icterus, no pallor, corneas clear, PERRLA   Ears:    Ears appear intact with no abnormalities noted   Throat:   No oral lesions, no thrush, oral mucosa moist   Neck:   No adenopathy, supple, trachea midline, no thyromegaly, no   carotid bruit, no JVD   Back:     No kyphosis present, no scoliosis present, no skin lesions,      erythema or scars, no tenderness to percussion or                   palpation,   range of motion normal   Lungs:     Clear to auscultation,respirations regular, even and                  unlabored    Heart:    Regular rhythm and normal rate, normal S1 and S2, no            murmur, no gallop, no rub, no click   Chest Wall:    No abnormalities observed   Abdomen:     Normal bowel sounds, no masses, no organomegaly, soft        non-tender, non-distended, no guarding,    Extremities:   Moves all extremities well, no edema, no cyanosis, no             redness   Pulses:   Pulses palpable and equal bilaterally   Skin:   No bleeding, bruising or rash   Lymph nodes:   No palpable adenopathy   Neurologic:   Cranial nerves 2 - 12 grossly intact, sensation intact, DTR       present and equal bilaterally       Results Review:   I reviewed the patient's new clinical results.  I reviewed the patient's new imaging results and agree with the interpretation.  I reviewed the patient's other test results and agree with the interpretation    Review of Systems was reviewed and confirmed as accurate as documented by the MA.    ASSESSMENT/PLAN:    1. Positive colorectal cancer screening using Cologuard test        I did have a detailed and extensive discussion with the " patient in the office today.  The full risks and benefits of operative versus nonoperative intervention were discussed with the paient, they understand, agree, and wish to proceed with the surgical treatment plan of colonoscopy.    He may need future upper endoscopy if his symptomatology persist.    Electronically signed by James Thomson MD  01/21/25 17:03 EST

## 2025-01-21 ENCOUNTER — OFFICE VISIT (OUTPATIENT)
Dept: SURGERY | Facility: CLINIC | Age: 45
End: 2025-01-21
Payer: COMMERCIAL

## 2025-01-21 VITALS
WEIGHT: 224.6 LBS | RESPIRATION RATE: 18 BRPM | SYSTOLIC BLOOD PRESSURE: 128 MMHG | TEMPERATURE: 97.8 F | BODY MASS INDEX: 31.44 KG/M2 | HEART RATE: 73 BPM | DIASTOLIC BLOOD PRESSURE: 68 MMHG | HEIGHT: 71 IN | OXYGEN SATURATION: 98 %

## 2025-01-21 DIAGNOSIS — R19.5 POSITIVE COLORECTAL CANCER SCREENING USING COLOGUARD TEST: Primary | ICD-10-CM

## 2025-01-21 PROCEDURE — 99213 OFFICE O/P EST LOW 20 MIN: CPT | Performed by: SURGERY

## 2025-01-21 RX ORDER — POLYETHYLENE GLYCOL 3350 17 G/17G
238 POWDER, FOR SOLUTION ORAL DAILY
Qty: 238 G | Refills: 0 | Status: SHIPPED | OUTPATIENT
Start: 2025-01-21 | End: 2025-01-22

## 2025-01-21 RX ORDER — BISACODYL 5 MG/1
TABLET, DELAYED RELEASE ORAL
Qty: 4 TABLET | Refills: 0 | Status: SHIPPED | OUTPATIENT
Start: 2025-01-21

## 2025-02-07 ENCOUNTER — OUTSIDE FACILITY SERVICE (OUTPATIENT)
Dept: SURGERY | Facility: CLINIC | Age: 45
End: 2025-02-07
Payer: COMMERCIAL

## 2025-02-11 ENCOUNTER — OFFICE VISIT (OUTPATIENT)
Dept: FAMILY MEDICINE CLINIC | Facility: CLINIC | Age: 45
End: 2025-02-11
Payer: COMMERCIAL

## 2025-02-11 VITALS
HEART RATE: 91 BPM | DIASTOLIC BLOOD PRESSURE: 68 MMHG | WEIGHT: 233.4 LBS | OXYGEN SATURATION: 95 % | BODY MASS INDEX: 32.68 KG/M2 | RESPIRATION RATE: 16 BRPM | HEIGHT: 71 IN | SYSTOLIC BLOOD PRESSURE: 110 MMHG

## 2025-02-11 DIAGNOSIS — K21.9 GASTROESOPHAGEAL REFLUX DISEASE WITHOUT ESOPHAGITIS: ICD-10-CM

## 2025-02-11 DIAGNOSIS — F41.9 ANXIETY: ICD-10-CM

## 2025-02-11 DIAGNOSIS — Z79.4 TYPE 2 DIABETES MELLITUS WITH HYPERGLYCEMIA, WITH LONG-TERM CURRENT USE OF INSULIN: Primary | ICD-10-CM

## 2025-02-11 DIAGNOSIS — E11.65 TYPE 2 DIABETES MELLITUS WITH HYPERGLYCEMIA, WITH LONG-TERM CURRENT USE OF INSULIN: Primary | ICD-10-CM

## 2025-02-11 DIAGNOSIS — E78.1 PURE HYPERTRIGLYCERIDEMIA: ICD-10-CM

## 2025-02-11 DIAGNOSIS — R53.83 FATIGUE, UNSPECIFIED TYPE: ICD-10-CM

## 2025-02-11 DIAGNOSIS — M10.9 GOUT, UNSPECIFIED CAUSE, UNSPECIFIED CHRONICITY, UNSPECIFIED SITE: ICD-10-CM

## 2025-02-11 LAB
EXPIRATION DATE: NORMAL
HBA1C MFR BLD: 5.3 % (ref 4.5–5.7)
Lab: NORMAL

## 2025-02-11 NOTE — PROGRESS NOTES
Subjective     Chief Complaint:    Chief Complaint   Patient presents with    Obesity     3 mth f/u       History of Present Illness:   Obesity/prediabetes/diabetes, has been off zepbound for about 5 weeks due to supply issues and c scope. He has gained some weight. He admits to drinking too much pop. Notes his portions have stayed the same. He is not exercising as much either due to weather.   Depression/anxiety/mood disorder, back on just zoloft and doing well   Hypertrigs, on tricor  Gout, no recent flares, on allopurinol and colcrys prn   ED  Notes continued fatigue, works night shift, wants to sleep all the time, denies hx of sleep apnea   GERD, on PPI     Review of Systems  Gen- No fevers, chills  CV- No chest pain, palpitations  Resp- No cough, dyspnea  GI- No N/V/D, abd pain  Neuro-No dizziness, headaches      I have reviewed and/or updated the patient's past medical, surgical, family, social history and problem list as appropriate.     Medications:    Current Outpatient Medications:     allopurinol (ZYLOPRIM) 300 MG tablet, Take 1 tablet by mouth Daily., Disp: 90 tablet, Rfl: 1    bisacodyl (Dulcolax) 5 MG EC tablet, Take 2 tablets at 3 pm and 2 tablets at 7 pm the day prior to colonoscopy, Disp: 4 tablet, Rfl: 0    cetirizine (zyrTEC) 10 MG tablet, Take 1 tablet by mouth Daily., Disp: 90 tablet, Rfl: 3    fenofibrate 160 MG tablet, , Disp: , Rfl:     multivitamin with minerals (MULTIVITAMIN ADULT PO), Take 1 tablet by mouth Daily., Disp: , Rfl:     omeprazole (priLOSEC) 40 MG capsule, omeprazole 40 mg capsule,delayed release, Disp: , Rfl:     sertraline (Zoloft) 50 MG tablet, Take 1 tablet by mouth Daily. 1/2 po daily x 7 days, then 1 po daily thereafter, Disp: 90 tablet, Rfl: 1    sildenafil (Viagra) 100 MG tablet, Take 1 tablet by mouth Daily As Needed for Erectile Dysfunction., Disp: 30 tablet, Rfl: 5    Zepbound 5 MG/0.5ML solution auto-injector, ADMINISTER 5 MG UNDER THE SKIN 1 TIME EVERY WEEK  "AS DIRECTED, Disp: 2 mL, Rfl: 2    Allergies:  Allergies   Allergen Reactions    Prednisone Dizziness       Objective     Vital Signs:   Vitals:    02/11/25 0816   BP: 110/68   Pulse: 91   Resp: 16   SpO2: 95%   Weight: 106 kg (233 lb 6.4 oz)   Height: 180.3 cm (71\")     Body mass index is 32.55 kg/m².    Physical Exam:    Physical Exam  Vitals and nursing note reviewed.   Constitutional:       Appearance: He is well-developed.   HENT:      Head: Normocephalic and atraumatic.   Eyes:      Pupils: Pupils are equal, round, and reactive to light.   Cardiovascular:      Rate and Rhythm: Normal rate and regular rhythm.      Heart sounds: Normal heart sounds.   Pulmonary:      Effort: Pulmonary effort is normal.      Breath sounds: Normal breath sounds.   Abdominal:      General: Bowel sounds are normal. There is no distension.      Palpations: Abdomen is soft.      Tenderness: There is no abdominal tenderness.   Musculoskeletal:      Cervical back: Neck supple.   Skin:     General: Skin is warm and dry.   Neurological:      General: No focal deficit present.      Mental Status: He is alert and oriented to person, place, and time.   Psychiatric:         Mood and Affect: Mood normal.         Behavior: Behavior normal.         Assessment / Plan     Assessment/Plan:   Problem List Items Addressed This Visit       Anxiety    Relevant Medications    sertraline (Zoloft) 50 MG tablet    GERD (gastroesophageal reflux disease)    Relevant Medications    omeprazole (priLOSEC) 40 MG capsule    Gout    HLD (hyperlipidemia)    Relevant Medications    fenofibrate 160 MG tablet     Other Visit Diagnoses       Type 2 diabetes mellitus with hyperglycemia, with long-term current use of insulin    -  Primary    Relevant Orders    POC Glycosylated Hemoglobin (Hb A1C) (Completed)    TSH Rfx On Abnormal To Free T4    MicroAlbumin, Urine, Random - Urine, Clean Catch    Fatigue, unspecified type        Relevant Orders    TSH Rfx On Abnormal To " Free T4    Vitamin B12    Vitamin D,25-Hydroxy    Folate    CBC Auto Differential    Comprehensive Metabolic Panel    Iron Profile    Ferritin          -- he wants to stop zebound, continue diet and exercise  -- gout stable  -- gerd stable, continue ppi  -- labs  -- if fatigue continues will consider sleep study       Discussed plan of care in detail with pt today; pt verb understanding and agrees.    Follow up:  3 months    Electronically signed by EDDI Doyle         Please note that portions of this note were completed with a voice recognition program.

## 2025-02-12 LAB
25(OH)D3+25(OH)D2 SERPL-MCNC: 24.4 NG/ML (ref 30–100)
ALBUMIN SERPL-MCNC: 4.1 G/DL (ref 3.5–5.2)
ALBUMIN/GLOB SERPL: 1.6 G/DL
ALP SERPL-CCNC: 68 U/L (ref 39–117)
ALT SERPL-CCNC: 41 U/L (ref 1–41)
AST SERPL-CCNC: 32 U/L (ref 1–40)
BASOPHILS # BLD AUTO: 0.05 10*3/MM3 (ref 0–0.2)
BASOPHILS NFR BLD AUTO: 0.4 % (ref 0–1.5)
BILIRUB SERPL-MCNC: 0.2 MG/DL (ref 0–1.2)
BUN SERPL-MCNC: 20 MG/DL (ref 6–20)
BUN/CREAT SERPL: 16.4 (ref 7–25)
CALCIUM SERPL-MCNC: 9.5 MG/DL (ref 8.6–10.5)
CHLORIDE SERPL-SCNC: 105 MMOL/L (ref 98–107)
CO2 SERPL-SCNC: 23.7 MMOL/L (ref 22–29)
CREAT SERPL-MCNC: 1.22 MG/DL (ref 0.76–1.27)
EGFRCR SERPLBLD CKD-EPI 2021: 74.5 ML/MIN/1.73
EOSINOPHIL # BLD AUTO: 0.4 10*3/MM3 (ref 0–0.4)
EOSINOPHIL NFR BLD AUTO: 3.5 % (ref 0.3–6.2)
ERYTHROCYTE [DISTWIDTH] IN BLOOD BY AUTOMATED COUNT: 12.7 % (ref 12.3–15.4)
FERRITIN SERPL-MCNC: 275 NG/ML (ref 30–400)
FOLATE SERPL-MCNC: 12.3 NG/ML (ref 4.78–24.2)
GLOBULIN SER CALC-MCNC: 2.6 GM/DL
GLUCOSE SERPL-MCNC: 123 MG/DL (ref 65–99)
HCT VFR BLD AUTO: 44.8 % (ref 37.5–51)
HGB BLD-MCNC: 15.1 G/DL (ref 13–17.7)
IMM GRANULOCYTES # BLD AUTO: 0.06 10*3/MM3 (ref 0–0.05)
IMM GRANULOCYTES NFR BLD AUTO: 0.5 % (ref 0–0.5)
IRON SATN MFR SERPL: 15 % (ref 20–50)
IRON SERPL-MCNC: 71 MCG/DL (ref 59–158)
LYMPHOCYTES # BLD AUTO: 3.67 10*3/MM3 (ref 0.7–3.1)
LYMPHOCYTES NFR BLD AUTO: 32.4 % (ref 19.6–45.3)
MCH RBC QN AUTO: 30.9 PG (ref 26.6–33)
MCHC RBC AUTO-ENTMCNC: 33.7 G/DL (ref 31.5–35.7)
MCV RBC AUTO: 91.6 FL (ref 79–97)
MONOCYTES # BLD AUTO: 0.68 10*3/MM3 (ref 0.1–0.9)
MONOCYTES NFR BLD AUTO: 6 % (ref 5–12)
NEUTROPHILS # BLD AUTO: 6.46 10*3/MM3 (ref 1.7–7)
NEUTROPHILS NFR BLD AUTO: 57.2 % (ref 42.7–76)
NRBC BLD AUTO-RTO: 0 /100 WBC (ref 0–0.2)
PLATELET # BLD AUTO: 272 10*3/MM3 (ref 140–450)
POTASSIUM SERPL-SCNC: 4.2 MMOL/L (ref 3.5–5.2)
PROT SERPL-MCNC: 6.7 G/DL (ref 6–8.5)
RBC # BLD AUTO: 4.89 10*6/MM3 (ref 4.14–5.8)
SODIUM SERPL-SCNC: 141 MMOL/L (ref 136–145)
TIBC SERPL-MCNC: 459 MCG/DL
TSH SERPL DL<=0.005 MIU/L-ACNC: 1.36 UIU/ML (ref 0.27–4.2)
UIBC SERPL-MCNC: 388 MCG/DL (ref 112–346)
UNABLE TO VOID: NORMAL
VIT B12 SERPL-MCNC: 546 PG/ML (ref 211–946)
WBC # BLD AUTO: 11.32 10*3/MM3 (ref 3.4–10.8)

## 2025-02-24 ENCOUNTER — PRIOR AUTHORIZATION (OUTPATIENT)
Dept: FAMILY MEDICINE CLINIC | Facility: CLINIC | Age: 45
End: 2025-02-24
Payer: COMMERCIAL

## 2025-03-03 NOTE — TELEPHONE ENCOUNTER
I called and left patient a voicemail in regards to the information insurance stated for PA on [employer]

## 2025-05-12 ENCOUNTER — OFFICE VISIT (OUTPATIENT)
Dept: FAMILY MEDICINE CLINIC | Facility: CLINIC | Age: 45
End: 2025-05-12
Payer: COMMERCIAL

## 2025-05-12 VITALS
WEIGHT: 234.6 LBS | BODY MASS INDEX: 32.84 KG/M2 | DIASTOLIC BLOOD PRESSURE: 72 MMHG | OXYGEN SATURATION: 95 % | SYSTOLIC BLOOD PRESSURE: 128 MMHG | HEART RATE: 81 BPM | HEIGHT: 71 IN | RESPIRATION RATE: 16 BRPM

## 2025-05-12 DIAGNOSIS — E78.1 PURE HYPERTRIGLYCERIDEMIA: ICD-10-CM

## 2025-05-12 DIAGNOSIS — G47.19 EXCESSIVE DAYTIME SLEEPINESS: ICD-10-CM

## 2025-05-12 DIAGNOSIS — M10.9 GOUT, UNSPECIFIED CAUSE, UNSPECIFIED CHRONICITY, UNSPECIFIED SITE: ICD-10-CM

## 2025-05-12 DIAGNOSIS — F39 MOOD DISORDER: ICD-10-CM

## 2025-05-12 DIAGNOSIS — D22.9 CHANGING NEVUS: ICD-10-CM

## 2025-05-12 DIAGNOSIS — K21.9 GASTROESOPHAGEAL REFLUX DISEASE WITHOUT ESOPHAGITIS: ICD-10-CM

## 2025-05-12 DIAGNOSIS — Z79.4 TYPE 2 DIABETES MELLITUS WITH HYPERGLYCEMIA, WITH LONG-TERM CURRENT USE OF INSULIN: Primary | ICD-10-CM

## 2025-05-12 DIAGNOSIS — E11.65 TYPE 2 DIABETES MELLITUS WITH HYPERGLYCEMIA, WITH LONG-TERM CURRENT USE OF INSULIN: Primary | ICD-10-CM

## 2025-05-12 DIAGNOSIS — R07.89 CHEST WALL PAIN: ICD-10-CM

## 2025-05-12 DIAGNOSIS — F41.9 ANXIETY: ICD-10-CM

## 2025-05-12 DIAGNOSIS — R06.83 SNORING: ICD-10-CM

## 2025-05-12 LAB
EXPIRATION DATE: NORMAL
HBA1C MFR BLD: 5.5 % (ref 4.5–5.7)
Lab: NORMAL

## 2025-05-12 PROCEDURE — 99214 OFFICE O/P EST MOD 30 MIN: CPT | Performed by: NURSE PRACTITIONER

## 2025-05-12 PROCEDURE — 83036 HEMOGLOBIN GLYCOSYLATED A1C: CPT | Performed by: NURSE PRACTITIONER

## 2025-05-12 RX ORDER — OMEPRAZOLE 40 MG/1
40 CAPSULE, DELAYED RELEASE ORAL DAILY
Qty: 30 CAPSULE | Refills: 3 | Status: SHIPPED | OUTPATIENT
Start: 2025-05-12 | End: 2025-05-12 | Stop reason: SDUPTHER

## 2025-05-12 RX ORDER — OMEPRAZOLE 40 MG/1
40 CAPSULE, DELAYED RELEASE ORAL DAILY
Qty: 90 CAPSULE | Refills: 3 | Status: SHIPPED | OUTPATIENT
Start: 2025-05-12

## 2025-05-12 NOTE — PROGRESS NOTES
Subjective     Chief Complaint:    Chief Complaint   Patient presents with    Diabetes       History of Present Illness:   Obesity/prediabetes, struggles with diet at times,   Depression/anxiety/mood disorder, still struggling with irritable fast at times   Hypertrigs, on tricor  Gout, no recent flares, on allopurinol and colcrys prn   ED  Notes continued fatigue, works night shift, wants to sleep all the time,   GERD, on PPI   Changing mole on the side of his left temple area, has been present for 2-3 years but changing and turning darker and irregular over the past few months   Midsternal chest pain that is reproducible with movement, ED visit in Dec for this and work up was normal     Review of Systems  Gen- No fevers, chills  CV- No chest pain, palpitations  Resp- No cough, dyspnea  GI- No N/V/D, abd pain  Neuro-No dizziness, headaches      I have reviewed and/or updated the patient's past medical, surgical, family, social history and problem list as appropriate.     Medications:    Current Outpatient Medications:     allopurinol (ZYLOPRIM) 300 MG tablet, Take 1 tablet by mouth Daily., Disp: 90 tablet, Rfl: 1    bisacodyl (Dulcolax) 5 MG EC tablet, Take 2 tablets at 3 pm and 2 tablets at 7 pm the day prior to colonoscopy, Disp: 4 tablet, Rfl: 0    cetirizine (zyrTEC) 10 MG tablet, Take 1 tablet by mouth Daily., Disp: 90 tablet, Rfl: 3    fenofibrate 160 MG tablet, , Disp: , Rfl:     multivitamin with minerals (MULTIVITAMIN ADULT PO), Take 1 tablet by mouth Daily., Disp: , Rfl:     omeprazole (priLOSEC) 40 MG capsule, Take 1 capsule by mouth Daily., Disp: 90 capsule, Rfl: 3    sertraline (Zoloft) 50 MG tablet, Take 1 tablet by mouth Daily. 1 po daily, Disp: 90 tablet, Rfl: 1    sildenafil (Viagra) 100 MG tablet, Take 1 tablet by mouth Daily As Needed for Erectile Dysfunction., Disp: 30 tablet, Rfl: 5    Allergies:  Allergies   Allergen Reactions    Prednisone Dizziness       Objective     Vital Signs:  "  Vitals:    05/12/25 0806   BP: 128/72   Pulse: 81   Resp: 16   SpO2: 95%   Weight: 106 kg (234 lb 9.6 oz)   Height: 180.3 cm (71\")     Body mass index is 32.72 kg/m².    Physical Exam:    Physical Exam  Vitals and nursing note reviewed.   Constitutional:       Appearance: He is well-developed.   HENT:      Head: Normocephalic and atraumatic.        Comments: Irregular mole appearing on left temple  Eyes:      Pupils: Pupils are equal, round, and reactive to light.   Cardiovascular:      Rate and Rhythm: Normal rate and regular rhythm.      Heart sounds: Normal heart sounds.   Pulmonary:      Effort: Pulmonary effort is normal.      Breath sounds: Normal breath sounds.   Chest:   Breasts:     Left: Tenderness present.   Abdominal:      General: Bowel sounds are normal. There is no distension.      Palpations: Abdomen is soft.      Tenderness: There is no abdominal tenderness.   Musculoskeletal:      Cervical back: Neck supple.   Skin:     General: Skin is warm and dry.   Neurological:      General: No focal deficit present.      Mental Status: He is alert and oriented to person, place, and time.   Psychiatric:         Mood and Affect: Mood normal.         Behavior: Behavior normal.         Assessment / Plan     Assessment/Plan:   Problem List Items Addressed This Visit       Anxiety    Relevant Medications    sertraline (Zoloft) 50 MG tablet    GERD (gastroesophageal reflux disease)    Relevant Medications    omeprazole (priLOSEC) 40 MG capsule    Gout    HLD (hyperlipidemia)    Relevant Medications    fenofibrate 160 MG tablet     Other Visit Diagnoses         Type 2 diabetes mellitus with hyperglycemia, with long-term current use of insulin    -  Primary    Relevant Orders    ORDER: POC Glycosylated Hemoglobin (Hb A1C) (Completed)      Mood disorder        Relevant Medications    sertraline (Zoloft) 50 MG tablet      Excessive daytime sleepiness        Relevant Orders    Ambulatory Referral to Sleep Medicine      " Snoring        Relevant Orders    Ambulatory Referral to Sleep Medicine      Chest wall pain        Relevant Orders    XR Chest PA & Lateral (In Office)      Changing nevus              -- A1c well controlled at 5.5  -- gout stable  -- gerd stable, continue ppi  -- mood stable for the most part, no change   -- needs sleep study   -- chest pain likely msk , check CXR  -- derm referral    Discussed plan of care in detail with pt today; pt verb understanding and agrees.    Follow up:  4 months     Electronically signed by EDDI Doyle         Please note that portions of this note were completed with a voice recognition program.

## 2025-05-21 ENCOUNTER — TELEPHONE (OUTPATIENT)
Dept: FAMILY MEDICINE CLINIC | Facility: CLINIC | Age: 45
End: 2025-05-21

## 2025-05-21 RX ORDER — ALLOPURINOL 300 MG/1
300 TABLET ORAL DAILY
Qty: 90 TABLET | Refills: 3 | Status: SHIPPED | OUTPATIENT
Start: 2025-05-21

## 2025-05-21 NOTE — TELEPHONE ENCOUNTER
Caller: Zi Nuñez    Relationship: Self    Best call back number:      [phone] (Mobile)       What was the call regarding:  PATIENT WAS SUPPOSE TO HAVE A REFERRAL FOR DERMATOLOGY FOR A SPOT ON HIS FACE   HE SAID HE HAS NOT HEARD FROM ANYONE    PLEASE CALL

## 2025-05-22 ENCOUNTER — RESULTS FOLLOW-UP (OUTPATIENT)
Dept: FAMILY MEDICINE CLINIC | Facility: CLINIC | Age: 45
End: 2025-05-22
Payer: COMMERCIAL

## 2025-05-22 DIAGNOSIS — R91.1 LUNG NODULE: Primary | ICD-10-CM

## 2025-05-22 NOTE — PROGRESS NOTES
Lung nodule noted on CT. Incidental finding. I do recommend a CT scan to look better at the nodule. Ordered.

## 2025-05-23 NOTE — TELEPHONE ENCOUNTER
Ariane:  Lung nodule noted on CT. Incidental finding. I do recommend a CT scan to look better at the nodule. Ordered.          Patient notified of results, verified understanding. WK

## 2025-05-27 ENCOUNTER — OFFICE VISIT (OUTPATIENT)
Dept: NEUROLOGY | Facility: CLINIC | Age: 45
End: 2025-05-27
Payer: COMMERCIAL

## 2025-05-27 VITALS
SYSTOLIC BLOOD PRESSURE: 117 MMHG | OXYGEN SATURATION: 95 % | TEMPERATURE: 98.6 F | HEART RATE: 78 BPM | WEIGHT: 232 LBS | BODY MASS INDEX: 32.48 KG/M2 | HEIGHT: 71 IN | DIASTOLIC BLOOD PRESSURE: 75 MMHG

## 2025-05-27 DIAGNOSIS — G47.19 EXCESSIVE DAYTIME SLEEPINESS: Primary | ICD-10-CM

## 2025-05-27 DIAGNOSIS — R06.83 SNORING: ICD-10-CM

## 2025-05-27 DIAGNOSIS — G47.33 OSA (OBSTRUCTIVE SLEEP APNEA): ICD-10-CM

## 2025-05-27 DIAGNOSIS — G47.9 RESTLESS SLEEPER: ICD-10-CM

## 2025-05-27 NOTE — PROGRESS NOTES
New Sleep Patient Office Visit      Patient Name: Zi Nuñez  : 1980   MRN: [id number]     Referring Physician: Ariane Kemp APRN    Chief Complaint:    Chief Complaint   Patient presents with    Consult     NP, in office to establish care for christina. Patient c/o snoring, trouble falling asleep and if he wakes up has trouble falling back asleep. Even if he has slept all night when he wakes up he still really tired.        History of Present Illness: Zi Nuñez is a 44 y.o. male who is here today to establish care with Sleep Medicine for history of CHRISTINA.  He was diagnosed with CHRISTINA about 7-8 years ago but was unable to tolerate CPAP therapy.  He complaints of daytime sleepiness and snoring.  Sleep questionnaire reviewed- it takes him 15-60 minutes to fall asleep at night, he wakes up a couple of times during sleep due to snoring or to use the bathroom, he has difficulty falling back to sleep, he sleeps 8 hours per night, he experiences restless or disturbed sleep, he feels better with more sleep, he snores moderately, he has been told he stops breathing during sleep, he wakes up with a dry mouth, he experiences decreased libido, he works 3rd shift, he has leg kicking/leg movements during sleep frequently, he has some leg cramps/spasms during sleep, he awakens with a headache on some mornings, he has heartburn and pain that interfere with sleep on some nights.  He would be willing to try CPAP therapy again, if needed.     Cranston Score: 9    Subjective      Review of Systems:   Review of Systems   Respiratory:  Positive for apnea.    Psychiatric/Behavioral:  Positive for sleep disturbance.        Past Medical History:   Past Medical History:   Diagnosis Date    Allergies     Anxiety     Diabetes mellitus     GERD (gastroesophageal reflux disease)     Gout     HLD (hyperlipidemia)     Hypertension     Kidney stone        Past Surgical History:   Past Surgical History:   Procedure Laterality Date     APPENDECTOMY      BREAST BIOPSY Right     Had fatty tumor removed       Family History:   Family History   Problem Relation Age of Onset    Other Mother     Diabetes Mother     Cancer Father     Arthritis Father     Other Sister     Arthritis Sister     Other Brother     Hyperlipidemia Brother     Hypertension Brother     Arthritis Brother     Cancer Paternal Grandfather        Social History:   Social History     Socioeconomic History    Marital status:    Tobacco Use    Smoking status: Every Day     Current packs/day: 1.00     Average packs/day: 1 pack/day for 0.8 years (0.8 ttl pk-yrs)     Types: Cigarettes     Start date: 8/1/2024     Last attempt to quit: 2006     Passive exposure: Past    Smokeless tobacco: Never   Vaping Use    Vaping status: Never Used   Substance and Sexual Activity    Alcohol use: Never    Drug use: Never    Sexual activity: Defer       Medications:     Current Outpatient Medications:     allopurinol (ZYLOPRIM) 300 MG tablet, TAKE 1 TABLET DAILY, Disp: 90 tablet, Rfl: 3    bisacodyl (Dulcolax) 5 MG EC tablet, Take 2 tablets at 3 pm and 2 tablets at 7 pm the day prior to colonoscopy, Disp: 4 tablet, Rfl: 0    cetirizine (zyrTEC) 10 MG tablet, Take 1 tablet by mouth Daily., Disp: 90 tablet, Rfl: 3    fenofibrate 160 MG tablet, , Disp: , Rfl:     multivitamin with minerals (MULTIVITAMIN ADULT PO), Take 1 tablet by mouth Daily., Disp: , Rfl:     omeprazole (priLOSEC) 40 MG capsule, Take 1 capsule by mouth Daily., Disp: 90 capsule, Rfl: 3    sertraline (Zoloft) 50 MG tablet, Take 1 tablet by mouth Daily. 1 po daily, Disp: 90 tablet, Rfl: 1    sildenafil (Viagra) 100 MG tablet, Take 1 tablet by mouth Daily As Needed for Erectile Dysfunction., Disp: 30 tablet, Rfl: 5    Allergies:   Allergies   Allergen Reactions    Prednisone Dizziness       Objective     Physical Exam:  Vital Signs:   Vitals:    05/27/25 1339   BP: 117/75   BP Location: Left arm   Patient Position: Sitting   Cuff Size:  "Adult   Pulse: 78   Temp: 98.6 °F (37 °C)   SpO2: 95%   Weight: 105 kg (232 lb)   Height: 180.3 cm (71\")   PainSc: 0-No pain     BMI: Body mass index is 32.36 kg/m².    Physical Exam  Vitals and nursing note reviewed.   Constitutional:       General: He is not in acute distress.     Appearance: Normal appearance. He is well-developed. He is obese. He is not diaphoretic.   HENT:      Head: Normocephalic and atraumatic.      Mouth/Throat:      Comments: Mallampati 4  Eyes:      Extraocular Movements: Extraocular movements intact.      Conjunctiva/sclera: Conjunctivae normal.   Neck:      Trachea: Trachea normal.   Pulmonary:      Effort: Pulmonary effort is normal. No respiratory distress.   Musculoskeletal:         General: Normal range of motion.   Skin:     General: Skin is warm and dry.   Neurological:      Mental Status: He is alert and oriented to person, place, and time.   Psychiatric:         Mood and Affect: Mood normal.         Behavior: Behavior normal.         Thought Content: Thought content normal.         Judgment: Judgment normal.         Assessment / Plan      Assessment/Plan:   Diagnoses and all orders for this visit:    1. Excessive daytime sleepiness (Primary)  -     Home Sleep Study; Future    2. Snoring  -     Home Sleep Study; Future    3. CHRISTINA (obstructive sleep apnea)  -     Home Sleep Study; Future    4. Restless sleeper  -     Home Sleep Study; Future       *Patient education on CHRISTINA provided today.  *He is agreeable to a home sleep study.        Follow Up:   Return in about 3 months (around 8/27/2025) for F/U Obstructive Sleep Apnea.    I have advised patient the gold standard for treatment of sleep apnea includes weight loss, use of cpap and avoidance of alcohol.  Encouraged weight loss (if applicable) with a BMI goal of 24.  I have discussed the implications of untreated significant CHRISTINA- the development of hypertension, increased risk of cardiovascular disease, increased risk of stroke, " work-related issues and driving accidents. I have counseled and advised the patient to avoid driving or operating heavy/dangerous equipment if feeling drowsy.     EDID Colon, FNP-C  [facility]

## 2025-05-29 DIAGNOSIS — L98.9 FACIAL LESION: Primary | ICD-10-CM

## 2025-05-29 NOTE — TELEPHONE ENCOUNTER
Spoke w pt. Notified that referral has been submitted to [facility] and someone from their office should contact him to discuss scheduling - or if we receive appointment information, we will reach out to him to confirm that he is aware of appt details.

## 2025-06-02 ENCOUNTER — HOSPITAL ENCOUNTER (OUTPATIENT)
Dept: CT IMAGING | Facility: HOSPITAL | Age: 45
Discharge: HOME | End: 2025-06-02
Admitting: NURSE PRACTITIONER
Payer: COMMERCIAL

## 2025-06-02 DIAGNOSIS — R91.1 LUNG NODULE: ICD-10-CM

## 2025-06-02 PROCEDURE — 71250 CT THORAX DX C-: CPT

## 2025-06-05 ENCOUNTER — TELEPHONE (OUTPATIENT)
Dept: FAMILY MEDICINE CLINIC | Facility: CLINIC | Age: 45
End: 2025-06-05

## 2025-06-05 NOTE — TELEPHONE ENCOUNTER
Caller: Zi Nuñez    Relationship: Self    Best call back number: [phone]     Caller requesting test results: PATIENT    What test was performed: CT SCAN    When was the test performed: MONDAY    Additional notes: PLEASE CALL WITH RESULTS.

## 2025-06-10 RX ORDER — FENOFIBRATE 160 MG/1
TABLET ORAL
OUTPATIENT
Start: 2025-06-10

## 2025-06-23 RX ORDER — FENOFIBRATE 160 MG/1
TABLET ORAL
Status: CANCELLED | OUTPATIENT
Start: 2025-06-23

## 2025-06-26 DIAGNOSIS — N52.9 ERECTILE DYSFUNCTION, UNSPECIFIED ERECTILE DYSFUNCTION TYPE: ICD-10-CM

## 2025-06-26 RX ORDER — SILDENAFIL 100 MG/1
100 TABLET, FILM COATED ORAL DAILY PRN
Qty: 30 TABLET | Refills: 5 | Status: SHIPPED | OUTPATIENT
Start: 2025-06-26

## 2025-06-26 NOTE — TELEPHONE ENCOUNTER
Attempted to contact patient regarding current directions of fenofibrate.    There was no answer, left voicemail for patient. I will also send the patient a Vionic message regarding directions for this medication.      I did also try to reach out to [facility] pharmacy regarding the directions on the fenofibrate, but per the pharmacy they have never filled this medication for the patient.

## 2025-06-27 RX ORDER — FENOFIBRATE 160 MG/1
160 TABLET ORAL DAILY
Qty: 90 TABLET | Refills: 3 | Status: SHIPPED | OUTPATIENT
Start: 2025-06-27

## 2025-07-15 ENCOUNTER — HOSPITAL ENCOUNTER (OUTPATIENT)
Dept: SLEEP MEDICINE | Facility: HOSPITAL | Age: 45
Discharge: HOME | End: 2025-07-15
Admitting: NURSE PRACTITIONER
Payer: COMMERCIAL

## 2025-07-15 VITALS — HEIGHT: 71 IN | BODY MASS INDEX: 32.41 KG/M2 | WEIGHT: 231.48 LBS

## 2025-07-15 DIAGNOSIS — R06.83 SNORING: ICD-10-CM

## 2025-07-15 DIAGNOSIS — G47.19 EXCESSIVE DAYTIME SLEEPINESS: ICD-10-CM

## 2025-07-15 DIAGNOSIS — G47.9 RESTLESS SLEEPER: ICD-10-CM

## 2025-07-15 DIAGNOSIS — G47.33 OSA (OBSTRUCTIVE SLEEP APNEA): ICD-10-CM

## 2025-07-15 PROCEDURE — G0399 HOME SLEEP TEST/TYPE 3 PORTA: HCPCS

## 2025-07-22 ENCOUNTER — OFFICE VISIT (OUTPATIENT)
Dept: FAMILY MEDICINE CLINIC | Facility: CLINIC | Age: 45
End: 2025-07-22
Payer: COMMERCIAL

## 2025-07-22 VITALS
WEIGHT: 234.2 LBS | DIASTOLIC BLOOD PRESSURE: 72 MMHG | HEART RATE: 76 BPM | RESPIRATION RATE: 18 BRPM | HEIGHT: 71 IN | OXYGEN SATURATION: 95 % | BODY MASS INDEX: 32.79 KG/M2 | SYSTOLIC BLOOD PRESSURE: 112 MMHG

## 2025-07-22 DIAGNOSIS — I10 PRIMARY HYPERTENSION: ICD-10-CM

## 2025-07-22 DIAGNOSIS — K21.9 GASTROESOPHAGEAL REFLUX DISEASE WITHOUT ESOPHAGITIS: ICD-10-CM

## 2025-07-22 DIAGNOSIS — E78.1 PURE HYPERTRIGLYCERIDEMIA: ICD-10-CM

## 2025-07-22 DIAGNOSIS — F33.1 MODERATE EPISODE OF RECURRENT MAJOR DEPRESSIVE DISORDER: Primary | ICD-10-CM

## 2025-07-22 DIAGNOSIS — F41.9 ANXIETY: ICD-10-CM

## 2025-07-22 PROCEDURE — 99214 OFFICE O/P EST MOD 30 MIN: CPT | Performed by: NURSE PRACTITIONER

## 2025-07-22 RX ORDER — FENOFIBRATE 160 MG/1
160 TABLET ORAL DAILY
Qty: 90 TABLET | Refills: 3 | Status: SHIPPED | OUTPATIENT
Start: 2025-07-22

## 2025-07-22 RX ORDER — DESVENLAFAXINE 50 MG/1
50 TABLET, FILM COATED, EXTENDED RELEASE ORAL DAILY
Qty: 30 TABLET | Refills: 1 | Status: SHIPPED | OUTPATIENT
Start: 2025-07-22

## 2025-07-22 NOTE — PROGRESS NOTES
Subjective     Chief Complaint:    Chief Complaint   Patient presents with    Depression     Pt wants to discuss making changes to depression mediction       History of Present Illness:   Patient here for worsening depression x 2 months.   Endorses irritability, fatigue, denies SI/HI.   Has tried zoloft, lamictal, lexapro, and wellbutrin.   Working 60-70 hours a week with 1-2 days off.   On days off, patient says that he is sleeping a lot.   Feels stuck.   Sleep is intermittently affected. Sleeping 7-8 hours a night.   Works night shift.     GERD well controlled prilosec.   No recent gout flare ups, taking allopurinol.   Allergies, taking zyrtec.   HLD, on fenofibrate.   Recently had sleep study. Following with sleep medicine. Reviewed with patient. AHI in the 40's.     Gen- No fevers, chills  CV- No chest pain, palpitations  Resp- No cough, dyspnea  GI- No N/V/D, abd pain  Neuro-No dizziness, headaches      I have reviewed and/or updated the patient's past medical, surgical, family, social history and problem list as appropriate.     Medications:    Current Outpatient Medications:     allopurinol (ZYLOPRIM) 300 MG tablet, TAKE 1 TABLET DAILY, Disp: 90 tablet, Rfl: 3    bisacodyl (Dulcolax) 5 MG EC tablet, Take 2 tablets at 3 pm and 2 tablets at 7 pm the day prior to colonoscopy, Disp: 4 tablet, Rfl: 0    cetirizine (zyrTEC) 10 MG tablet, Take 1 tablet by mouth Daily., Disp: 90 tablet, Rfl: 3    fenofibrate 160 MG tablet, Take 1 tablet by mouth Daily., Disp: 90 tablet, Rfl: 3    multivitamin with minerals (MULTIVITAMIN ADULT PO), Take 1 tablet by mouth Daily., Disp: , Rfl:     omeprazole (priLOSEC) 40 MG capsule, Take 1 capsule by mouth Daily., Disp: 90 capsule, Rfl: 3    sildenafil (Viagra) 100 MG tablet, Take 1 tablet by mouth Daily As Needed for Erectile Dysfunction., Disp: 30 tablet, Rfl: 5    desvenlafaxine (Pristiq) 50 MG 24 hr tablet, Take 1 tablet by mouth Daily., Disp: 30 tablet, Rfl:  "1    Allergies:  Allergies   Allergen Reactions    Prednisone Dizziness       Objective     Vital Signs:   Vitals:    07/22/25 0828   BP: 112/72   Pulse: 76   Resp: 18   SpO2: 95%   Weight: 106 kg (234 lb 3.2 oz)   Height: 180.3 cm (70.98\")     Body mass index is 32.68 kg/m².    Physical Exam:    Physical Exam  Constitutional:       Appearance: Normal appearance.   HENT:      Head: Normocephalic and atraumatic.   Cardiovascular:      Rate and Rhythm: Normal rate and regular rhythm.   Pulmonary:      Effort: Pulmonary effort is normal.      Breath sounds: Normal breath sounds.   Abdominal:      General: Bowel sounds are normal.   Skin:     General: Skin is warm and dry.   Neurological:      General: No focal deficit present.      Mental Status: He is alert and oriented to person, place, and time. Mental status is at baseline.   Psychiatric:         Mood and Affect: Mood is depressed. Affect is flat.         Assessment / Plan     Assessment/Plan:   Problem List Items Addressed This Visit       Anxiety    Relevant Medications    desvenlafaxine (Pristiq) 50 MG 24 hr tablet    GERD (gastroesophageal reflux disease)    Relevant Medications    multivitamin with minerals (MULTIVITAMIN ADULT PO)    bisacodyl (Dulcolax) 5 MG EC tablet    omeprazole (priLOSEC) 40 MG capsule    HLD (hyperlipidemia)    Relevant Medications    fenofibrate 160 MG tablet    Hypertension    Relevant Medications    sildenafil (Viagra) 100 MG tablet     Other Visit Diagnoses         Moderate episode of recurrent major depressive disorder    -  Primary    Relevant Medications    desvenlafaxine (Pristiq) 50 MG 24 hr tablet          -- consider genesight   --Stop taking sertraline by taking 1/2 tab over the next two weeks and then, depending on symptoms of withdrawal, take 1/2 tab every other day if needed until stopped.   --Begin desvenlafaxine 50mg daily.   -- continue f/u with neuro  -- other chronic conditions stable      Discussed plan of care in " detail with pt today; pt verb understanding and agrees.    Follow up:  Return in about 4 weeks (around 8/19/2025).      Electronically signed by EDDI Doyle   07/22/2025 08:40 EDT      Please note that portions of this note were completed with a voice recognition program.   I, Ariane MONTAGUE, personally performed the services described in this documentation, as scribed by Pura Wayne, EDDI Student in my presence, and is both accurate and complete

## 2025-08-06 ENCOUNTER — OFFICE VISIT (OUTPATIENT)
Dept: FAMILY MEDICINE CLINIC | Facility: CLINIC | Age: 45
End: 2025-08-06
Payer: COMMERCIAL

## 2025-08-06 VITALS
HEIGHT: 71 IN | DIASTOLIC BLOOD PRESSURE: 78 MMHG | SYSTOLIC BLOOD PRESSURE: 110 MMHG | BODY MASS INDEX: 32.73 KG/M2 | WEIGHT: 233.8 LBS | RESPIRATION RATE: 18 BRPM | HEART RATE: 88 BPM | OXYGEN SATURATION: 99 %

## 2025-08-06 DIAGNOSIS — L03.317 CELLULITIS AND ABSCESS OF BUTTOCK: Primary | ICD-10-CM

## 2025-08-06 DIAGNOSIS — L02.31 CELLULITIS AND ABSCESS OF BUTTOCK: Primary | ICD-10-CM

## 2025-08-06 PROCEDURE — 99213 OFFICE O/P EST LOW 20 MIN: CPT | Performed by: NURSE PRACTITIONER

## 2025-08-06 RX ORDER — MUPIROCIN 2 %
1 OINTMENT (GRAM) TOPICAL 2 TIMES DAILY
Qty: 15 G | Refills: 0 | Status: SHIPPED | OUTPATIENT
Start: 2025-08-06

## 2025-08-06 RX ORDER — SULFAMETHOXAZOLE AND TRIMETHOPRIM 800; 160 MG/1; MG/1
1 TABLET ORAL 2 TIMES DAILY
Qty: 14 TABLET | Refills: 0 | Status: SHIPPED | OUTPATIENT
Start: 2025-08-06 | End: 2025-08-13

## 2025-08-11 LAB
BACTERIA SPEC CULT: ABNORMAL
BACTERIA SPEC CULT: ABNORMAL
MICROORGANISM/AGENT SPEC: ABNORMAL
OTHER ANTIBIOTIC SUSC ISLT: ABNORMAL

## 2025-08-19 ENCOUNTER — OFFICE VISIT (OUTPATIENT)
Dept: FAMILY MEDICINE CLINIC | Facility: CLINIC | Age: 45
End: 2025-08-19
Payer: COMMERCIAL

## 2025-08-19 VITALS
SYSTOLIC BLOOD PRESSURE: 124 MMHG | HEIGHT: 71 IN | OXYGEN SATURATION: 95 % | HEART RATE: 80 BPM | WEIGHT: 233.6 LBS | BODY MASS INDEX: 32.7 KG/M2 | RESPIRATION RATE: 18 BRPM | DIASTOLIC BLOOD PRESSURE: 78 MMHG

## 2025-08-19 DIAGNOSIS — E66.811 CLASS 1 OBESITY WITH SERIOUS COMORBIDITY AND BODY MASS INDEX (BMI) OF 32.0 TO 32.9 IN ADULT, UNSPECIFIED OBESITY TYPE: ICD-10-CM

## 2025-08-19 DIAGNOSIS — L02.31 CELLULITIS AND ABSCESS OF BUTTOCK: ICD-10-CM

## 2025-08-19 DIAGNOSIS — F33.1 MODERATE EPISODE OF RECURRENT MAJOR DEPRESSIVE DISORDER: Primary | ICD-10-CM

## 2025-08-19 DIAGNOSIS — L03.317 CELLULITIS AND ABSCESS OF BUTTOCK: ICD-10-CM

## 2025-08-19 PROCEDURE — 99214 OFFICE O/P EST MOD 30 MIN: CPT | Performed by: NURSE PRACTITIONER
